# Patient Record
Sex: FEMALE | Race: ASIAN | NOT HISPANIC OR LATINO | URBAN - METROPOLITAN AREA
[De-identification: names, ages, dates, MRNs, and addresses within clinical notes are randomized per-mention and may not be internally consistent; named-entity substitution may affect disease eponyms.]

---

## 2017-07-01 ENCOUNTER — OUTPATIENT (OUTPATIENT)
Dept: OUTPATIENT SERVICES | Facility: HOSPITAL | Age: 72
LOS: 1 days | End: 2017-07-01
Payer: MEDICAID

## 2017-07-11 DIAGNOSIS — R69 ILLNESS, UNSPECIFIED: ICD-10-CM

## 2017-08-01 PROCEDURE — G9001: CPT

## 2023-08-13 ENCOUNTER — NON-APPOINTMENT (OUTPATIENT)
Age: 78
End: 2023-08-13

## 2023-08-15 PROBLEM — Z00.00 ENCOUNTER FOR PREVENTIVE HEALTH EXAMINATION: Status: ACTIVE | Noted: 2023-08-15

## 2023-09-11 ENCOUNTER — APPOINTMENT (OUTPATIENT)
Dept: OBGYN | Facility: CLINIC | Age: 78
End: 2023-09-11

## 2023-10-04 ENCOUNTER — APPOINTMENT (OUTPATIENT)
Dept: OBGYN | Facility: CLINIC | Age: 78
End: 2023-10-04
Payer: MEDICARE

## 2023-10-04 VITALS
HEART RATE: 88 BPM | TEMPERATURE: 98.2 F | DIASTOLIC BLOOD PRESSURE: 76 MMHG | OXYGEN SATURATION: 97 % | HEIGHT: 61 IN | WEIGHT: 106 LBS | RESPIRATION RATE: 15 BRPM | BODY MASS INDEX: 20.01 KG/M2 | SYSTOLIC BLOOD PRESSURE: 154 MMHG

## 2023-10-04 PROCEDURE — 99203 OFFICE O/P NEW LOW 30 MIN: CPT

## 2023-10-12 ENCOUNTER — APPOINTMENT (OUTPATIENT)
Dept: OBGYN | Facility: CLINIC | Age: 78
End: 2023-10-12
Payer: MEDICARE

## 2023-10-12 VITALS
RESPIRATION RATE: 15 BRPM | DIASTOLIC BLOOD PRESSURE: 74 MMHG | HEIGHT: 61 IN | SYSTOLIC BLOOD PRESSURE: 152 MMHG | WEIGHT: 106 LBS | TEMPERATURE: 98.2 F | HEART RATE: 81 BPM | BODY MASS INDEX: 20.01 KG/M2 | OXYGEN SATURATION: 98 %

## 2023-10-12 PROCEDURE — 99213 OFFICE O/P EST LOW 20 MIN: CPT

## 2023-10-16 ENCOUNTER — APPOINTMENT (OUTPATIENT)
Dept: OBGYN | Facility: CLINIC | Age: 78
End: 2023-10-16
Payer: MEDICARE

## 2023-10-16 VITALS
HEIGHT: 61 IN | OXYGEN SATURATION: 97 % | HEART RATE: 108 BPM | RESPIRATION RATE: 16 BRPM | TEMPERATURE: 98.7 F | DIASTOLIC BLOOD PRESSURE: 80 MMHG | SYSTOLIC BLOOD PRESSURE: 132 MMHG

## 2023-10-16 LAB — CYTOLOGY CVX/VAG DOC THIN PREP: ABNORMAL

## 2023-10-16 PROCEDURE — 99213 OFFICE O/P EST LOW 20 MIN: CPT

## 2023-10-18 ENCOUNTER — OUTPATIENT (OUTPATIENT)
Dept: OUTPATIENT SERVICES | Facility: HOSPITAL | Age: 78
LOS: 1 days | End: 2023-10-18

## 2023-10-18 VITALS
WEIGHT: 104.94 LBS | SYSTOLIC BLOOD PRESSURE: 149 MMHG | RESPIRATION RATE: 18 BRPM | TEMPERATURE: 99 F | HEIGHT: 60.5 IN | OXYGEN SATURATION: 97 % | DIASTOLIC BLOOD PRESSURE: 84 MMHG | HEART RATE: 78 BPM

## 2023-10-18 DIAGNOSIS — Z98.42 CATARACT EXTRACTION STATUS, LEFT EYE: Chronic | ICD-10-CM

## 2023-10-18 DIAGNOSIS — N95.0 POSTMENOPAUSAL BLEEDING: ICD-10-CM

## 2023-10-18 DIAGNOSIS — R03.0 ELEVATED BLOOD-PRESSURE READING, WITHOUT DIAGNOSIS OF HYPERTENSION: ICD-10-CM

## 2023-10-18 DIAGNOSIS — Z98.890 OTHER SPECIFIED POSTPROCEDURAL STATES: Chronic | ICD-10-CM

## 2023-10-18 LAB
A1C WITH ESTIMATED AVERAGE GLUCOSE RESULT: 6.3 % — HIGH (ref 4–5.6)
A1C WITH ESTIMATED AVERAGE GLUCOSE RESULT: 6.3 % — HIGH (ref 4–5.6)
ANION GAP SERPL CALC-SCNC: 10 MMOL/L — SIGNIFICANT CHANGE UP (ref 7–14)
ANION GAP SERPL CALC-SCNC: 10 MMOL/L — SIGNIFICANT CHANGE UP (ref 7–14)
BUN SERPL-MCNC: 12 MG/DL — SIGNIFICANT CHANGE UP (ref 7–23)
BUN SERPL-MCNC: 12 MG/DL — SIGNIFICANT CHANGE UP (ref 7–23)
CALCIUM SERPL-MCNC: 9.6 MG/DL — SIGNIFICANT CHANGE UP (ref 8.4–10.5)
CALCIUM SERPL-MCNC: 9.6 MG/DL — SIGNIFICANT CHANGE UP (ref 8.4–10.5)
CHLORIDE SERPL-SCNC: 103 MMOL/L — SIGNIFICANT CHANGE UP (ref 98–107)
CHLORIDE SERPL-SCNC: 103 MMOL/L — SIGNIFICANT CHANGE UP (ref 98–107)
CO2 SERPL-SCNC: 28 MMOL/L — SIGNIFICANT CHANGE UP (ref 22–31)
CO2 SERPL-SCNC: 28 MMOL/L — SIGNIFICANT CHANGE UP (ref 22–31)
CREAT SERPL-MCNC: 0.73 MG/DL — SIGNIFICANT CHANGE UP (ref 0.5–1.3)
CREAT SERPL-MCNC: 0.73 MG/DL — SIGNIFICANT CHANGE UP (ref 0.5–1.3)
EGFR: 84 ML/MIN/1.73M2 — SIGNIFICANT CHANGE UP
EGFR: 84 ML/MIN/1.73M2 — SIGNIFICANT CHANGE UP
ESTIMATED AVERAGE GLUCOSE: 134 — SIGNIFICANT CHANGE UP
ESTIMATED AVERAGE GLUCOSE: 134 — SIGNIFICANT CHANGE UP
GLUCOSE SERPL-MCNC: 130 MG/DL — HIGH (ref 70–99)
GLUCOSE SERPL-MCNC: 130 MG/DL — HIGH (ref 70–99)
HCT VFR BLD CALC: 37.9 % — SIGNIFICANT CHANGE UP (ref 34.5–45)
HCT VFR BLD CALC: 37.9 % — SIGNIFICANT CHANGE UP (ref 34.5–45)
HGB BLD-MCNC: 13 G/DL — SIGNIFICANT CHANGE UP (ref 11.5–15.5)
HGB BLD-MCNC: 13 G/DL — SIGNIFICANT CHANGE UP (ref 11.5–15.5)
MCHC RBC-ENTMCNC: 30.1 PG — SIGNIFICANT CHANGE UP (ref 27–34)
MCHC RBC-ENTMCNC: 30.1 PG — SIGNIFICANT CHANGE UP (ref 27–34)
MCHC RBC-ENTMCNC: 34.3 GM/DL — SIGNIFICANT CHANGE UP (ref 32–36)
MCHC RBC-ENTMCNC: 34.3 GM/DL — SIGNIFICANT CHANGE UP (ref 32–36)
MCV RBC AUTO: 87.7 FL — SIGNIFICANT CHANGE UP (ref 80–100)
MCV RBC AUTO: 87.7 FL — SIGNIFICANT CHANGE UP (ref 80–100)
NRBC # BLD: 0 /100 WBCS — SIGNIFICANT CHANGE UP (ref 0–0)
NRBC # BLD: 0 /100 WBCS — SIGNIFICANT CHANGE UP (ref 0–0)
NRBC # FLD: 0 K/UL — SIGNIFICANT CHANGE UP (ref 0–0)
NRBC # FLD: 0 K/UL — SIGNIFICANT CHANGE UP (ref 0–0)
PLATELET # BLD AUTO: 238 K/UL — SIGNIFICANT CHANGE UP (ref 150–400)
PLATELET # BLD AUTO: 238 K/UL — SIGNIFICANT CHANGE UP (ref 150–400)
POTASSIUM SERPL-MCNC: 4.4 MMOL/L — SIGNIFICANT CHANGE UP (ref 3.5–5.3)
POTASSIUM SERPL-MCNC: 4.4 MMOL/L — SIGNIFICANT CHANGE UP (ref 3.5–5.3)
POTASSIUM SERPL-SCNC: 4.4 MMOL/L — SIGNIFICANT CHANGE UP (ref 3.5–5.3)
POTASSIUM SERPL-SCNC: 4.4 MMOL/L — SIGNIFICANT CHANGE UP (ref 3.5–5.3)
RBC # BLD: 4.32 M/UL — SIGNIFICANT CHANGE UP (ref 3.8–5.2)
RBC # BLD: 4.32 M/UL — SIGNIFICANT CHANGE UP (ref 3.8–5.2)
RBC # FLD: 11.8 % — SIGNIFICANT CHANGE UP (ref 10.3–14.5)
RBC # FLD: 11.8 % — SIGNIFICANT CHANGE UP (ref 10.3–14.5)
SODIUM SERPL-SCNC: 141 MMOL/L — SIGNIFICANT CHANGE UP (ref 135–145)
SODIUM SERPL-SCNC: 141 MMOL/L — SIGNIFICANT CHANGE UP (ref 135–145)
WBC # BLD: 6.9 K/UL — SIGNIFICANT CHANGE UP (ref 3.8–10.5)
WBC # BLD: 6.9 K/UL — SIGNIFICANT CHANGE UP (ref 3.8–10.5)
WBC # FLD AUTO: 6.9 K/UL — SIGNIFICANT CHANGE UP (ref 3.8–10.5)
WBC # FLD AUTO: 6.9 K/UL — SIGNIFICANT CHANGE UP (ref 3.8–10.5)

## 2023-10-18 RX ORDER — MISOPROSTOL 200 UG/1
200 TABLET ORAL
Qty: 2 | Refills: 0 | Status: ACTIVE | COMMUNITY
Start: 2023-10-18 | End: 1900-01-01

## 2023-10-18 RX ORDER — SODIUM CHLORIDE 9 MG/ML
1000 INJECTION, SOLUTION INTRAVENOUS
Refills: 0 | Status: DISCONTINUED | OUTPATIENT
Start: 2023-10-24 | End: 2023-11-07

## 2023-10-18 NOTE — H&P PST ADULT - NSICDXFAMILYHX_GEN_ALL_CORE_FT
FAMILY HISTORY:  Mother  Still living? No  Family history of cancer of hard palate, Age at diagnosis: Age Unknown    Sibling  Still living? Unknown  FH: pneumonia, Age at diagnosis: Age Unknown

## 2023-10-18 NOTE — H&P PST ADULT - NSICDXPASTMEDICALHX_GEN_ALL_CORE_FT
PAST MEDICAL HISTORY:  Glaucoma     Hyperlipidemia     PMB (postmenopausal bleeding)     Right cataract     Type 2 diabetes, diet controlled

## 2023-10-18 NOTE — H&P PST ADULT - ENMT COMMENTS
has full upper and lower dentures Mercedes Flap Text: The defect edges were debeveled with a #15 scalpel blade.  Given the location of the defect, shape of the defect and the proximity to free margins a Mercedes flap was deemed most appropriate.  Using a sterile surgical marker, an appropriate advancement flap was drawn incorporating the defect and placing the expected incisions within the relaxed skin tension lines where possible. The area thus outlined was incised deep to adipose tissue with a #15 scalpel blade.  The skin margins were undermined to an appropriate distance in all directions utilizing iris scissors. Mallampati III on phonation

## 2023-10-18 NOTE — H&P PST ADULT - PROBLEM SELECTOR PLAN 2
Pt. states "it's only high when I have go to the Doctor's office."  Pt. states "when I take it at home it's always normal so my Doctor is ok with that."

## 2023-10-18 NOTE — H&P PST ADULT - PROBLEM SELECTOR PLAN 1
Pt. is scheduled for a D&C hysteroscopy 10/24/23.  Pt. verbalized understanding of instructions.  Pt. had medical clearance as requested by the Surgeon.  The clearance is in the chart.

## 2023-10-23 ENCOUNTER — APPOINTMENT (OUTPATIENT)
Dept: OBGYN | Facility: CLINIC | Age: 78
End: 2023-10-23
Payer: MEDICARE

## 2023-10-23 ENCOUNTER — TRANSCRIPTION ENCOUNTER (OUTPATIENT)
Age: 78
End: 2023-10-23

## 2023-10-23 VITALS
OXYGEN SATURATION: 98 % | HEART RATE: 84 BPM | DIASTOLIC BLOOD PRESSURE: 74 MMHG | WEIGHT: 106 LBS | RESPIRATION RATE: 16 BRPM | HEIGHT: 61 IN | SYSTOLIC BLOOD PRESSURE: 160 MMHG | TEMPERATURE: 98.3 F | BODY MASS INDEX: 20.01 KG/M2

## 2023-10-23 PROCEDURE — 99213 OFFICE O/P EST LOW 20 MIN: CPT

## 2023-10-24 ENCOUNTER — TRANSCRIPTION ENCOUNTER (OUTPATIENT)
Age: 78
End: 2023-10-24

## 2023-10-24 ENCOUNTER — OUTPATIENT (OUTPATIENT)
Dept: OUTPATIENT SERVICES | Facility: HOSPITAL | Age: 78
LOS: 1 days | Discharge: HOME | End: 2023-10-24
Payer: MEDICARE

## 2023-10-24 VITALS
SYSTOLIC BLOOD PRESSURE: 173 MMHG | TEMPERATURE: 98 F | OXYGEN SATURATION: 99 % | HEART RATE: 76 BPM | DIASTOLIC BLOOD PRESSURE: 67 MMHG | HEIGHT: 60.5 IN | RESPIRATION RATE: 15 BRPM | WEIGHT: 104.94 LBS

## 2023-10-24 VITALS
SYSTOLIC BLOOD PRESSURE: 112 MMHG | DIASTOLIC BLOOD PRESSURE: 50 MMHG | OXYGEN SATURATION: 100 % | HEART RATE: 67 BPM | RESPIRATION RATE: 19 BRPM

## 2023-10-24 DIAGNOSIS — Z98.890 OTHER SPECIFIED POSTPROCEDURAL STATES: Chronic | ICD-10-CM

## 2023-10-24 DIAGNOSIS — N95.0 POSTMENOPAUSAL BLEEDING: ICD-10-CM

## 2023-10-24 DIAGNOSIS — Z98.42 CATARACT EXTRACTION STATUS, LEFT EYE: Chronic | ICD-10-CM

## 2023-10-24 LAB
GLUCOSE BLDC GLUCOMTR-MCNC: 112 MG/DL — HIGH (ref 70–99)
GLUCOSE BLDC GLUCOMTR-MCNC: 112 MG/DL — HIGH (ref 70–99)

## 2023-10-24 PROCEDURE — 58120 DILATION AND CURETTAGE: CPT

## 2023-10-24 PROCEDURE — 58555 HYSTEROSCOPY DX SEP PROC: CPT

## 2023-10-24 NOTE — ASU DISCHARGE PLAN (ADULT/PEDIATRIC) - CARE PROVIDER_API CALL
Alexa Headley  Obstetrics and Gynecology  [address], [address]  [place] [address]  Phone: [phone]  Fax: [phone]  Follow Up Time: 2 weeks

## 2023-10-24 NOTE — DISCHARGE NOTE NURSING/CASE MANAGEMENT/SOCIAL WORK - NSDCPEFALRISK_GEN_ALL_CORE
For information on Fall & Injury Prevention, visit: [email or web address] OR  [email or web address]/[email or web address]-injury OR  https://www.cdc.gov/steadi/patient.html

## 2023-10-24 NOTE — DISCHARGE NOTE NURSING/CASE MANAGEMENT/SOCIAL WORK - PATIENT PORTAL LINK FT
You can access the FollowMyHealth Patient Portal offered by [facility] by registering at the following website: [email or web address]. By joining [employer]’s FollowMyHealth portal, you will also be able to view your health information using other applications (apps) compatible with our system.

## 2023-10-25 ENCOUNTER — RESULT REVIEW (OUTPATIENT)
Age: 78
End: 2023-10-25

## 2023-10-25 PROCEDURE — 88305 TISSUE EXAM BY PATHOLOGIST: CPT | Mod: 26

## 2023-10-25 PROCEDURE — 88341 IMHCHEM/IMCYTCHM EA ADD ANTB: CPT | Mod: 26

## 2023-10-25 PROCEDURE — 88342 IMHCHEM/IMCYTCHM 1ST ANTB: CPT | Mod: 26

## 2023-11-06 LAB
SURGICAL PATHOLOGY STUDY: SIGNIFICANT CHANGE UP
SURGICAL PATHOLOGY STUDY: SIGNIFICANT CHANGE UP

## 2023-11-09 ENCOUNTER — APPOINTMENT (OUTPATIENT)
Dept: OBGYN | Facility: CLINIC | Age: 78
End: 2023-11-09
Payer: MEDICARE

## 2023-11-09 ENCOUNTER — NON-APPOINTMENT (OUTPATIENT)
Age: 78
End: 2023-11-09

## 2023-11-09 VITALS
DIASTOLIC BLOOD PRESSURE: 84 MMHG | BODY MASS INDEX: 20.01 KG/M2 | HEIGHT: 61 IN | HEART RATE: 76 BPM | WEIGHT: 106 LBS | SYSTOLIC BLOOD PRESSURE: 154 MMHG | TEMPERATURE: 98.3 F | RESPIRATION RATE: 16 BRPM | OXYGEN SATURATION: 99 %

## 2023-11-09 PROCEDURE — 99213 OFFICE O/P EST LOW 20 MIN: CPT

## 2023-11-10 PROBLEM — H26.9 UNSPECIFIED CATARACT: Chronic | Status: ACTIVE | Noted: 2023-10-18

## 2023-11-10 PROBLEM — E78.5 HYPERLIPIDEMIA, UNSPECIFIED: Chronic | Status: ACTIVE | Noted: 2023-10-18

## 2023-11-10 PROBLEM — N95.0 POSTMENOPAUSAL BLEEDING: Chronic | Status: ACTIVE | Noted: 2023-10-18

## 2023-11-10 PROBLEM — E11.9 TYPE 2 DIABETES MELLITUS WITHOUT COMPLICATIONS: Chronic | Status: ACTIVE | Noted: 2023-10-18

## 2023-11-10 PROBLEM — H40.9 UNSPECIFIED GLAUCOMA: Chronic | Status: ACTIVE | Noted: 2023-10-18

## 2023-11-16 PROBLEM — N95.0 PMB (POSTMENOPAUSAL BLEEDING): Status: ACTIVE | Noted: 2023-10-04

## 2023-11-17 ENCOUNTER — APPOINTMENT (OUTPATIENT)
Dept: GYNECOLOGIC ONCOLOGY | Facility: CLINIC | Age: 78
End: 2023-11-17
Payer: MEDICARE

## 2023-11-17 ENCOUNTER — RESULT REVIEW (OUTPATIENT)
Age: 78
End: 2023-11-17

## 2023-11-17 VITALS
HEIGHT: 61 IN | BODY MASS INDEX: 20.2 KG/M2 | WEIGHT: 107 LBS | DIASTOLIC BLOOD PRESSURE: 81 MMHG | SYSTOLIC BLOOD PRESSURE: 148 MMHG | HEART RATE: 87 BPM

## 2023-11-17 DIAGNOSIS — Z82.49 FAMILY HISTORY OF ISCHEMIC HEART DISEASE AND OTHER DISEASES OF THE CIRCULATORY SYSTEM: ICD-10-CM

## 2023-11-17 DIAGNOSIS — Z86.39 PERSONAL HISTORY OF OTHER ENDOCRINE, NUTRITIONAL AND METABOLIC DISEASE: ICD-10-CM

## 2023-11-17 DIAGNOSIS — Z80.9 FAMILY HISTORY OF MALIGNANT NEOPLASM, UNSPECIFIED: ICD-10-CM

## 2023-11-17 DIAGNOSIS — N95.0 POSTMENOPAUSAL BLEEDING: ICD-10-CM

## 2023-11-17 DIAGNOSIS — Z86.69 PERSONAL HISTORY OF OTHER DISEASES OF THE NERVOUS SYSTEM AND SENSE ORGANS: ICD-10-CM

## 2023-11-17 DIAGNOSIS — Z78.9 OTHER SPECIFIED HEALTH STATUS: ICD-10-CM

## 2023-11-17 DIAGNOSIS — Z87.19 PERSONAL HISTORY OF OTHER DISEASES OF THE DIGESTIVE SYSTEM: ICD-10-CM

## 2023-11-17 LAB
APTT BLD: 30.8 SEC
INR PPP: 1.01 RATIO
PT BLD: 11.4 SEC

## 2023-11-17 PROCEDURE — 99205 OFFICE O/P NEW HI 60 MIN: CPT

## 2023-11-17 RX ORDER — BRIMONIDINE/DORZOLAMIDE/PF 0.15 %-2 %
0.15-2 DROPS OPHTHALMIC (EYE)
Refills: 0 | Status: ACTIVE | COMMUNITY

## 2023-11-17 RX ORDER — LATANOPROST/PF 0.005 %
0.01 DROPS OPHTHALMIC (EYE)
Refills: 0 | Status: ACTIVE | COMMUNITY

## 2023-11-17 RX ORDER — FEXOFENADINE HCL 60 MG
TABLET ORAL
Refills: 0 | Status: ACTIVE | COMMUNITY

## 2023-11-17 RX ORDER — ATORVASTATIN CALCIUM 20 MG/1
20 TABLET, FILM COATED ORAL
Refills: 0 | Status: ACTIVE | COMMUNITY

## 2023-11-17 RX ORDER — ASCORBIC ACID 500 MG
TABLET ORAL
Refills: 0 | Status: ACTIVE | COMMUNITY

## 2023-11-17 RX ORDER — FAMOTIDINE 20 MG/1
20 TABLET, FILM COATED ORAL
Refills: 0 | Status: ACTIVE | COMMUNITY

## 2023-11-20 ENCOUNTER — APPOINTMENT (OUTPATIENT)
Dept: CT IMAGING | Facility: CLINIC | Age: 78
End: 2023-11-20
Payer: MEDICARE

## 2023-11-20 LAB
ALBUMIN SERPL ELPH-MCNC: 4.4 G/DL
ALP BLD-CCNC: 67 U/L
ALT SERPL-CCNC: 12 U/L
ANION GAP SERPL CALC-SCNC: 12 MMOL/L
AST SERPL-CCNC: 20 U/L
BILIRUB SERPL-MCNC: 0.3 MG/DL
BUN SERPL-MCNC: 13 MG/DL
CALCIUM SERPL-MCNC: 9.5 MG/DL
CANCER AG125 SERPL-ACNC: 36 U/ML
CANCER AG19-9 SERPL-ACNC: 12 U/ML
CEA SERPL-MCNC: 3.3 NG/ML
CHLORIDE SERPL-SCNC: 102 MMOL/L
CO2 SERPL-SCNC: 27 MMOL/L
CREAT SERPL-MCNC: 0.77 MG/DL
EGFR: 79 ML/MIN/1.73M2
ESTIMATED AVERAGE GLUCOSE: 148 MG/DL
GLUCOSE SERPL-MCNC: 113 MG/DL
HBA1C MFR BLD HPLC: 6.8 %
POTASSIUM SERPL-SCNC: 4.2 MMOL/L
PROT SERPL-MCNC: 7.3 G/DL
SODIUM SERPL-SCNC: 141 MMOL/L

## 2023-11-20 PROCEDURE — 71260 CT THORAX DX C+: CPT

## 2023-11-20 PROCEDURE — 74177 CT ABD & PELVIS W/CONTRAST: CPT

## 2023-11-21 ENCOUNTER — APPOINTMENT (OUTPATIENT)
Dept: GYNECOLOGIC ONCOLOGY | Facility: CLINIC | Age: 78
End: 2023-11-21
Payer: MEDICARE

## 2023-11-21 PROCEDURE — 99212 OFFICE O/P EST SF 10 MIN: CPT | Mod: 95

## 2023-12-05 ENCOUNTER — OUTPATIENT (OUTPATIENT)
Dept: OUTPATIENT SERVICES | Facility: HOSPITAL | Age: 78
LOS: 1 days | End: 2023-12-05
Payer: COMMERCIAL

## 2023-12-05 VITALS
TEMPERATURE: 97 F | WEIGHT: 106.04 LBS | HEIGHT: 61 IN | SYSTOLIC BLOOD PRESSURE: 137 MMHG | DIASTOLIC BLOOD PRESSURE: 71 MMHG | RESPIRATION RATE: 16 BRPM | HEART RATE: 89 BPM | OXYGEN SATURATION: 98 %

## 2023-12-05 DIAGNOSIS — Z98.890 OTHER SPECIFIED POSTPROCEDURAL STATES: Chronic | ICD-10-CM

## 2023-12-05 DIAGNOSIS — C54.1 MALIGNANT NEOPLASM OF ENDOMETRIUM: ICD-10-CM

## 2023-12-05 DIAGNOSIS — Z98.42 CATARACT EXTRACTION STATUS, LEFT EYE: Chronic | ICD-10-CM

## 2023-12-05 DIAGNOSIS — E78.5 HYPERLIPIDEMIA, UNSPECIFIED: ICD-10-CM

## 2023-12-05 DIAGNOSIS — N95.0 POSTMENOPAUSAL BLEEDING: ICD-10-CM

## 2023-12-05 DIAGNOSIS — Z01.818 ENCOUNTER FOR OTHER PREPROCEDURAL EXAMINATION: ICD-10-CM

## 2023-12-05 DIAGNOSIS — K21.9 GASTRO-ESOPHAGEAL REFLUX DISEASE WITHOUT ESOPHAGITIS: ICD-10-CM

## 2023-12-05 LAB
ALBUMIN SERPL ELPH-MCNC: 3.2 G/DL — LOW (ref 3.5–5)
ALBUMIN SERPL ELPH-MCNC: 3.2 G/DL — LOW (ref 3.5–5)
ALP SERPL-CCNC: 58 U/L — SIGNIFICANT CHANGE UP (ref 40–120)
ALP SERPL-CCNC: 58 U/L — SIGNIFICANT CHANGE UP (ref 40–120)
ALT FLD-CCNC: 20 U/L DA — SIGNIFICANT CHANGE UP (ref 10–60)
ALT FLD-CCNC: 20 U/L DA — SIGNIFICANT CHANGE UP (ref 10–60)
ANION GAP SERPL CALC-SCNC: 3 MMOL/L — LOW (ref 5–17)
ANION GAP SERPL CALC-SCNC: 3 MMOL/L — LOW (ref 5–17)
APTT BLD: 30.1 SEC — SIGNIFICANT CHANGE UP (ref 24.5–35.6)
APTT BLD: 30.1 SEC — SIGNIFICANT CHANGE UP (ref 24.5–35.6)
AST SERPL-CCNC: 19 U/L — SIGNIFICANT CHANGE UP (ref 10–40)
AST SERPL-CCNC: 19 U/L — SIGNIFICANT CHANGE UP (ref 10–40)
BILIRUB SERPL-MCNC: 0.3 MG/DL — SIGNIFICANT CHANGE UP (ref 0.2–1.2)
BILIRUB SERPL-MCNC: 0.3 MG/DL — SIGNIFICANT CHANGE UP (ref 0.2–1.2)
BLD GP AB SCN SERPL QL: SIGNIFICANT CHANGE UP
BLD GP AB SCN SERPL QL: SIGNIFICANT CHANGE UP
BUN SERPL-MCNC: 7 MG/DL — SIGNIFICANT CHANGE UP (ref 7–18)
BUN SERPL-MCNC: 7 MG/DL — SIGNIFICANT CHANGE UP (ref 7–18)
CALCIUM SERPL-MCNC: 9.1 MG/DL — SIGNIFICANT CHANGE UP (ref 8.4–10.5)
CALCIUM SERPL-MCNC: 9.1 MG/DL — SIGNIFICANT CHANGE UP (ref 8.4–10.5)
CHLORIDE SERPL-SCNC: 105 MMOL/L — SIGNIFICANT CHANGE UP (ref 96–108)
CHLORIDE SERPL-SCNC: 105 MMOL/L — SIGNIFICANT CHANGE UP (ref 96–108)
CO2 SERPL-SCNC: 30 MMOL/L — SIGNIFICANT CHANGE UP (ref 22–31)
CO2 SERPL-SCNC: 30 MMOL/L — SIGNIFICANT CHANGE UP (ref 22–31)
CREAT SERPL-MCNC: 0.67 MG/DL — SIGNIFICANT CHANGE UP (ref 0.5–1.3)
CREAT SERPL-MCNC: 0.67 MG/DL — SIGNIFICANT CHANGE UP (ref 0.5–1.3)
EGFR: 89 ML/MIN/1.73M2 — SIGNIFICANT CHANGE UP
EGFR: 89 ML/MIN/1.73M2 — SIGNIFICANT CHANGE UP
GLUCOSE SERPL-MCNC: 129 MG/DL — HIGH (ref 70–99)
GLUCOSE SERPL-MCNC: 129 MG/DL — HIGH (ref 70–99)
HCT VFR BLD CALC: 36.7 % — SIGNIFICANT CHANGE UP (ref 34.5–45)
HCT VFR BLD CALC: 36.7 % — SIGNIFICANT CHANGE UP (ref 34.5–45)
HGB BLD-MCNC: 11.9 G/DL — SIGNIFICANT CHANGE UP (ref 11.5–15.5)
HGB BLD-MCNC: 11.9 G/DL — SIGNIFICANT CHANGE UP (ref 11.5–15.5)
INR BLD: 1.04 RATIO — SIGNIFICANT CHANGE UP (ref 0.85–1.18)
INR BLD: 1.04 RATIO — SIGNIFICANT CHANGE UP (ref 0.85–1.18)
MCHC RBC-ENTMCNC: 29 PG — SIGNIFICANT CHANGE UP (ref 27–34)
MCHC RBC-ENTMCNC: 29 PG — SIGNIFICANT CHANGE UP (ref 27–34)
MCHC RBC-ENTMCNC: 32.4 GM/DL — SIGNIFICANT CHANGE UP (ref 32–36)
MCHC RBC-ENTMCNC: 32.4 GM/DL — SIGNIFICANT CHANGE UP (ref 32–36)
MCV RBC AUTO: 89.5 FL — SIGNIFICANT CHANGE UP (ref 80–100)
MCV RBC AUTO: 89.5 FL — SIGNIFICANT CHANGE UP (ref 80–100)
NRBC # BLD: 0 /100 WBCS — SIGNIFICANT CHANGE UP (ref 0–0)
NRBC # BLD: 0 /100 WBCS — SIGNIFICANT CHANGE UP (ref 0–0)
PLATELET # BLD AUTO: 258 K/UL — SIGNIFICANT CHANGE UP (ref 150–400)
PLATELET # BLD AUTO: 258 K/UL — SIGNIFICANT CHANGE UP (ref 150–400)
POTASSIUM SERPL-MCNC: 3.5 MMOL/L — SIGNIFICANT CHANGE UP (ref 3.5–5.3)
POTASSIUM SERPL-MCNC: 3.5 MMOL/L — SIGNIFICANT CHANGE UP (ref 3.5–5.3)
POTASSIUM SERPL-SCNC: 3.5 MMOL/L — SIGNIFICANT CHANGE UP (ref 3.5–5.3)
POTASSIUM SERPL-SCNC: 3.5 MMOL/L — SIGNIFICANT CHANGE UP (ref 3.5–5.3)
PROT SERPL-MCNC: 7.4 G/DL — SIGNIFICANT CHANGE UP (ref 6–8.3)
PROT SERPL-MCNC: 7.4 G/DL — SIGNIFICANT CHANGE UP (ref 6–8.3)
PROTHROM AB SERPL-ACNC: 11.8 SEC — SIGNIFICANT CHANGE UP (ref 9.5–13)
PROTHROM AB SERPL-ACNC: 11.8 SEC — SIGNIFICANT CHANGE UP (ref 9.5–13)
RBC # BLD: 4.1 M/UL — SIGNIFICANT CHANGE UP (ref 3.8–5.2)
RBC # BLD: 4.1 M/UL — SIGNIFICANT CHANGE UP (ref 3.8–5.2)
RBC # FLD: 12.4 % — SIGNIFICANT CHANGE UP (ref 10.3–14.5)
RBC # FLD: 12.4 % — SIGNIFICANT CHANGE UP (ref 10.3–14.5)
SODIUM SERPL-SCNC: 138 MMOL/L — SIGNIFICANT CHANGE UP (ref 135–145)
SODIUM SERPL-SCNC: 138 MMOL/L — SIGNIFICANT CHANGE UP (ref 135–145)
WBC # BLD: 8.83 K/UL — SIGNIFICANT CHANGE UP (ref 3.8–10.5)
WBC # BLD: 8.83 K/UL — SIGNIFICANT CHANGE UP (ref 3.8–10.5)
WBC # FLD AUTO: 8.83 K/UL — SIGNIFICANT CHANGE UP (ref 3.8–10.5)
WBC # FLD AUTO: 8.83 K/UL — SIGNIFICANT CHANGE UP (ref 3.8–10.5)

## 2023-12-05 RX ORDER — IBUPROFEN 200 MG
1 TABLET ORAL
Qty: 0 | Refills: 0 | DISCHARGE

## 2023-12-05 RX ORDER — ACETAMINOPHEN 500 MG
2 TABLET ORAL
Qty: 0 | Refills: 0 | DISCHARGE

## 2023-12-05 NOTE — H&P PST ADULT - PROBLEM SELECTOR PLAN 1
Pt schedule for Examination Under Anesthesia Robotic Assisted Total Laparoscopic Hysterectomy with Bilateral Salpingo-oophorectomy and [place] Lymph Node Mapping with Staging Possible Open on 12/14/23 with Dr Quintero drawn by PST - will f/u result  Pt was seen by PCP for Medical clearance - will f/u report    Pt was  instructed to stop aspirin/ecotrin and all over the counter medication including vitamins and herbal supplements one week prior to surgery   Instructions given on the use of 4% chlorhexidine wash and Pt verbalized understanding of same   Pt Instructed to have nothing by mouth starting midnight day before surgery  Patient is to expect a phone call day before surgery between the hours of 430- 630pm giving arrival time for surgery   Written and verbal preoperative instructions given to patient with understanding verbalized.     Patient today with STOP bang score 1  Low  risk for MISTY Pt schedule for Examination Under Anesthesia Robotic Assisted Total Laparoscopic Hysterectomy with Bilateral Salpingo-oophorectomy and [place] Lymph Node Mapping with Staging Possible Open on 12/14/23 with Dr Quintero drawn by PST - will f/u result  Pt was seen by PCP for Medical clearance - will f/u report    Pt was  instructed to stop aspirin/ecotrin and all over the counter medication including vitamins and herbal supplements one week prior to surgery   Instructions given on the use of 4% chlorhexidine wash and Pt verbalized understanding of same   Pt Instructed to have nothing by mouth starting midnight day before surgery  Patient is to expect a phone call day before surgery between the hours of 430- 630pm giving arrival time for surgery   Written and verbal preoperative instructions given to patient with understanding verbalized.     Patient today with STOP bang score 1  Low  risk for MISTY

## 2023-12-05 NOTE — H&P PST ADULT - NSICDXPROCEDURE_GEN_ALL_CORE_FT
PROCEDURES:  Robot-assisted laparoscopic total hysterectomy with salpingo-oophorectomy for uterus 250 grams or less using da Robert Xi 05-Dec-2023 11:57:30  Shanita Antoine

## 2023-12-05 NOTE — H&P PST ADULT - REASON FOR ADMISSION
Examination Under Anesthesia Robotic Assisted Total Laparoscopic Hysterectomy with Bilateral Salpingo-OOphorectomy and [place] Lymph Node Mapping with Staging Possible Open  on 12/14/23 with Dr Alonso Examination Under Anesthesia Robotic Assisted Total Laparoscopic Hysterectomy with Bilateral Salpingo-OOphorectomy and [place] Lymph Node Mapping with Staging Possible Open  on 12/14/23 with Dr Alonso

## 2023-12-05 NOTE — H&P PST ADULT - HISTORY OF PRESENT ILLNESS
78 y.o female with PMHx for GERD, HLD. c/o abnormal vaginal bleeding, s/p D&C 10/2023, pathology was consistent with Malignant Neoplasm of Endometrium  now presents for presurgical evaluation for schedule Examination Under Anesthesia Robotic Assisted Total Laparoscopic Hysterectomy with Bilateral Salpingo-oophorectomy  and [place] Lymph Node Mapping with Staging Possible Open on 12/14/23 with Dr Alonso 78 y.o female with PMHx for GERD, HLD. c/o abnormal vaginal bleeding, s/p D&C 10/2023, pathology was consistent with Malignant Neoplasm of Endometrium  now presents for presurgical evaluation for schedule Examination Under Anesthesia Robotic Assisted Total Laparoscopic Hysterectomy with Bilateral Salpingo-oophorectomy  and [place] Lymph Node Mapping with Staging Possible Open on 12/14/23 with Dr Alonso

## 2023-12-05 NOTE — H&P PST ADULT - ASSESSMENT
78 y.o female with with Malignant Neoplasm of Endometrium now presents for schedule Examination Under Anesthesia Robotic Assisted Total Laparoscopic Hysterectomy with Bilateral Salpingo-oophorectomy and [place] Lymph Node Mapping with Staging Possible Open on 12/14/23 with Dr Celeste GIL 1     78 y.o female with with Malignant Neoplasm of Endometrium now presents for schedule Examination Under Anesthesia Robotic Assisted Total Laparoscopic Hysterectomy with Bilateral Salpingo-oophorectomy and [place] Lymph Node Mapping with Staging Possible Open on 12/14/23 with Dr Celeste GIL 1     78 y.o female with Malignant Neoplasm of Endometrium now for schedule Examination Under Anesthesia Robotic Assisted Total Laparoscopic Hysterectomy with Bilateral Salpingo-oophorectomy and [place] Lymph Node Mapping with Staging Possible Open on 12/14/23 with Dr Celeste GIL 1     78 y.o female with Malignant Neoplasm of Endometrium now for schedule Examination Under Anesthesia Robotic Assisted Total Laparoscopic Hysterectomy with Bilateral Salpingo-oophorectomy and [place] Lymph Node Mapping with Staging Possible Open on 12/14/23 with Dr Celeste GIL 1

## 2023-12-05 NOTE — H&P PST ADULT - ATTENDING COMMENTS
Patient seen in ASU with her family friend Catia, no changes reported. Consent form reviewed, including examination by learner and possible conversion to open procedure, debulking. All questions answered.    Deja Aguila MD

## 2023-12-05 NOTE — H&P PST ADULT - PROBLEM SELECTOR PROBLEM 3
GERD (gastroesophageal reflux disease)
Pt will achieve gradual 1-2lbs/week weight gain to improve nutrition status

## 2023-12-05 NOTE — H&P PST ADULT - NSICDXPASTMEDICALHX_GEN_ALL_CORE_FT
PAST MEDICAL HISTORY:  Glaucoma     Hyperlipidemia     PMB (postmenopausal bleeding)     Right cataract     Type 2 diabetes, diet controlled      PAST MEDICAL HISTORY:  Glaucoma     Hyperlipidemia     Malignant neoplasm of endometrium     PMB (postmenopausal bleeding)     Right cataract     Type 2 diabetes, diet controlled

## 2023-12-06 LAB
A1C WITH ESTIMATED AVERAGE GLUCOSE RESULT: 6.5 % — HIGH (ref 4–5.6)
A1C WITH ESTIMATED AVERAGE GLUCOSE RESULT: 6.5 % — HIGH (ref 4–5.6)
ESTIMATED AVERAGE GLUCOSE: 140 MG/DL — HIGH (ref 68–114)
ESTIMATED AVERAGE GLUCOSE: 140 MG/DL — HIGH (ref 68–114)

## 2023-12-06 PROCEDURE — G0463: CPT

## 2023-12-12 RX ORDER — CIPROFLOXACIN HYDROCHLORIDE 500 MG/1
500 TABLET, FILM COATED ORAL
Qty: 2 | Refills: 0 | Status: ACTIVE | COMMUNITY
Start: 1900-01-01 | End: 1900-01-01

## 2023-12-12 RX ORDER — METRONIDAZOLE 500 MG/1
500 TABLET ORAL
Qty: 2 | Refills: 0 | Status: ACTIVE | COMMUNITY
Start: 1900-01-01 | End: 1900-01-01

## 2023-12-13 ENCOUNTER — TRANSCRIPTION ENCOUNTER (OUTPATIENT)
Age: 78
End: 2023-12-13

## 2023-12-14 ENCOUNTER — RESULT REVIEW (OUTPATIENT)
Age: 78
End: 2023-12-14

## 2023-12-14 ENCOUNTER — APPOINTMENT (OUTPATIENT)
Dept: GYNECOLOGIC ONCOLOGY | Facility: HOSPITAL | Age: 78
End: 2023-12-14

## 2023-12-14 ENCOUNTER — TRANSCRIPTION ENCOUNTER (OUTPATIENT)
Age: 78
End: 2023-12-14

## 2023-12-14 ENCOUNTER — INPATIENT (INPATIENT)
Facility: HOSPITAL | Age: 78
LOS: 0 days | Discharge: HOME | DRG: 734 | End: 2023-12-15
Attending: OBSTETRICS & GYNECOLOGY | Admitting: OBSTETRICS & GYNECOLOGY
Payer: COMMERCIAL

## 2023-12-14 VITALS
WEIGHT: 106.04 LBS | OXYGEN SATURATION: 97 % | HEART RATE: 108 BPM | RESPIRATION RATE: 16 BRPM | SYSTOLIC BLOOD PRESSURE: 128 MMHG | DIASTOLIC BLOOD PRESSURE: 71 MMHG | HEIGHT: 61 IN | TEMPERATURE: 99 F

## 2023-12-14 DIAGNOSIS — Z98.890 OTHER SPECIFIED POSTPROCEDURAL STATES: Chronic | ICD-10-CM

## 2023-12-14 DIAGNOSIS — C54.1 MALIGNANT NEOPLASM OF ENDOMETRIUM: ICD-10-CM

## 2023-12-14 DIAGNOSIS — Z98.42 CATARACT EXTRACTION STATUS, LEFT EYE: Chronic | ICD-10-CM

## 2023-12-14 DIAGNOSIS — N95.0 POSTMENOPAUSAL BLEEDING: ICD-10-CM

## 2023-12-14 LAB
BLD GP AB SCN SERPL QL: SIGNIFICANT CHANGE UP
BLD GP AB SCN SERPL QL: SIGNIFICANT CHANGE UP
GLUCOSE BLDC GLUCOMTR-MCNC: 123 MG/DL — HIGH (ref 70–99)
GLUCOSE BLDC GLUCOMTR-MCNC: 123 MG/DL — HIGH (ref 70–99)
GLUCOSE BLDC GLUCOMTR-MCNC: 152 MG/DL — HIGH (ref 70–99)
GLUCOSE BLDC GLUCOMTR-MCNC: 152 MG/DL — HIGH (ref 70–99)
GLUCOSE BLDC GLUCOMTR-MCNC: 164 MG/DL — HIGH (ref 70–99)
GLUCOSE BLDC GLUCOMTR-MCNC: 164 MG/DL — HIGH (ref 70–99)
GLUCOSE BLDC GLUCOMTR-MCNC: 177 MG/DL — HIGH (ref 70–99)
GLUCOSE BLDC GLUCOMTR-MCNC: 177 MG/DL — HIGH (ref 70–99)
GLUCOSE BLDC GLUCOMTR-MCNC: 187 MG/DL — HIGH (ref 70–99)
GLUCOSE BLDC GLUCOMTR-MCNC: 187 MG/DL — HIGH (ref 70–99)
GLUCOSE BLDC GLUCOMTR-MCNC: 188 MG/DL — HIGH (ref 70–99)
GLUCOSE BLDC GLUCOMTR-MCNC: 188 MG/DL — HIGH (ref 70–99)
GLUCOSE BLDC GLUCOMTR-MCNC: 202 MG/DL — HIGH (ref 70–99)
GLUCOSE BLDC GLUCOMTR-MCNC: 202 MG/DL — HIGH (ref 70–99)

## 2023-12-14 PROCEDURE — 88305 TISSUE EXAM BY PATHOLOGIST: CPT | Mod: 26,59

## 2023-12-14 PROCEDURE — 88309 TISSUE EXAM BY PATHOLOGIST: CPT | Mod: 26

## 2023-12-14 PROCEDURE — 88307 TISSUE EXAM BY PATHOLOGIST: CPT | Mod: 26

## 2023-12-14 PROCEDURE — 88342 IMHCHEM/IMCYTCHM 1ST ANTB: CPT | Mod: 26

## 2023-12-14 PROCEDURE — 88112 CYTOPATH CELL ENHANCE TECH: CPT | Mod: 26

## 2023-12-14 PROCEDURE — 88305 TISSUE EXAM BY PATHOLOGIST: CPT | Mod: 26

## 2023-12-14 PROCEDURE — 88341 IMHCHEM/IMCYTCHM EA ADD ANTB: CPT | Mod: 26

## 2023-12-14 DEVICE — VISTASEAL FIBRIN HUMAN 10ML: Type: IMPLANTABLE DEVICE | Status: FUNCTIONAL

## 2023-12-14 RX ORDER — DORZOLAMIDE HYDROCHLORIDE 20 MG/ML
2 SOLUTION/ DROPS OPHTHALMIC
Refills: 0 | DISCHARGE

## 2023-12-14 RX ORDER — OXYCODONE HYDROCHLORIDE 5 MG/1
10 TABLET ORAL EVERY 4 HOURS
Refills: 0 | Status: DISCONTINUED | OUTPATIENT
Start: 2023-12-14 | End: 2023-12-15

## 2023-12-14 RX ORDER — HYDROMORPHONE HYDROCHLORIDE 2 MG/ML
0.5 INJECTION INTRAMUSCULAR; INTRAVENOUS; SUBCUTANEOUS
Refills: 0 | Status: DISCONTINUED | OUTPATIENT
Start: 2023-12-14 | End: 2023-12-14

## 2023-12-14 RX ORDER — ACETAMINOPHEN 500 MG
1000 TABLET ORAL EVERY 6 HOURS
Refills: 0 | Status: DISCONTINUED | OUTPATIENT
Start: 2023-12-14 | End: 2023-12-15

## 2023-12-14 RX ORDER — SENNA PLUS 8.6 MG/1
2 TABLET ORAL AT BEDTIME
Refills: 0 | Status: DISCONTINUED | OUTPATIENT
Start: 2023-12-14 | End: 2023-12-14

## 2023-12-14 RX ORDER — GLUCAGON INJECTION, SOLUTION 0.5 MG/.1ML
1 INJECTION, SOLUTION SUBCUTANEOUS ONCE
Refills: 0 | Status: DISCONTINUED | OUTPATIENT
Start: 2023-12-14 | End: 2023-12-15

## 2023-12-14 RX ORDER — FAMOTIDINE 10 MG/ML
1 INJECTION INTRAVENOUS
Refills: 0 | DISCHARGE

## 2023-12-14 RX ORDER — ONDANSETRON 8 MG/1
4 TABLET, FILM COATED ORAL EVERY 6 HOURS
Refills: 0 | Status: DISCONTINUED | OUTPATIENT
Start: 2023-12-14 | End: 2023-12-15

## 2023-12-14 RX ORDER — OXYCODONE HYDROCHLORIDE 5 MG/1
5 TABLET ORAL EVERY 4 HOURS
Refills: 0 | Status: DISCONTINUED | OUTPATIENT
Start: 2023-12-14 | End: 2023-12-15

## 2023-12-14 RX ORDER — DEXTROSE 50 % IN WATER 50 %
12.5 SYRINGE (ML) INTRAVENOUS ONCE
Refills: 0 | Status: DISCONTINUED | OUTPATIENT
Start: 2023-12-14 | End: 2023-12-15

## 2023-12-14 RX ORDER — DEXTROSE 50 % IN WATER 50 %
25 SYRINGE (ML) INTRAVENOUS ONCE
Refills: 0 | Status: DISCONTINUED | OUTPATIENT
Start: 2023-12-14 | End: 2023-12-15

## 2023-12-14 RX ORDER — INSULIN LISPRO 100/ML
VIAL (ML) SUBCUTANEOUS AT BEDTIME
Refills: 0 | Status: DISCONTINUED | OUTPATIENT
Start: 2023-12-14 | End: 2023-12-15

## 2023-12-14 RX ORDER — IBUPROFEN 200 MG
600 TABLET ORAL EVERY 8 HOURS
Refills: 0 | Status: DISCONTINUED | OUTPATIENT
Start: 2023-12-14 | End: 2023-12-15

## 2023-12-14 RX ORDER — SODIUM CHLORIDE 9 MG/ML
1000 INJECTION, SOLUTION INTRAVENOUS
Refills: 0 | Status: DISCONTINUED | OUTPATIENT
Start: 2023-12-14 | End: 2023-12-14

## 2023-12-14 RX ORDER — HYDROMORPHONE HYDROCHLORIDE 2 MG/ML
1 INJECTION INTRAMUSCULAR; INTRAVENOUS; SUBCUTANEOUS
Refills: 0 | Status: DISCONTINUED | OUTPATIENT
Start: 2023-12-14 | End: 2023-12-14

## 2023-12-14 RX ORDER — CHLORHEXIDINE GLUCONATE 213 G/1000ML
1 SOLUTION TOPICAL ONCE
Refills: 0 | Status: COMPLETED | OUTPATIENT
Start: 2023-12-14 | End: 2023-12-14

## 2023-12-14 RX ORDER — INSULIN LISPRO 100/ML
VIAL (ML) SUBCUTANEOUS
Refills: 0 | Status: DISCONTINUED | OUTPATIENT
Start: 2023-12-14 | End: 2023-12-15

## 2023-12-14 RX ORDER — POLYETHYLENE GLYCOL 3350 17 G/17G
17 POWDER, FOR SOLUTION ORAL DAILY
Refills: 0 | Status: DISCONTINUED | OUTPATIENT
Start: 2023-12-14 | End: 2023-12-15

## 2023-12-14 RX ORDER — SODIUM CHLORIDE 9 MG/ML
1000 INJECTION, SOLUTION INTRAVENOUS
Refills: 0 | Status: DISCONTINUED | OUTPATIENT
Start: 2023-12-14 | End: 2023-12-15

## 2023-12-14 RX ORDER — ATORVASTATIN CALCIUM 80 MG/1
1 TABLET, FILM COATED ORAL
Refills: 0 | DISCHARGE

## 2023-12-14 RX ORDER — SENNA PLUS 8.6 MG/1
2 TABLET ORAL AT BEDTIME
Refills: 0 | Status: DISCONTINUED | OUTPATIENT
Start: 2023-12-14 | End: 2023-12-15

## 2023-12-14 RX ORDER — SODIUM CHLORIDE 9 MG/ML
3 INJECTION INTRAMUSCULAR; INTRAVENOUS; SUBCUTANEOUS EVERY 8 HOURS
Refills: 0 | Status: DISCONTINUED | OUTPATIENT
Start: 2023-12-14 | End: 2023-12-14

## 2023-12-14 RX ORDER — METOPROLOL TARTRATE 50 MG
5 TABLET ORAL EVERY 6 HOURS
Refills: 0 | Status: DISCONTINUED | OUTPATIENT
Start: 2023-12-14 | End: 2023-12-15

## 2023-12-14 RX ORDER — LATANOPROST 0.05 MG/ML
1 SOLUTION/ DROPS OPHTHALMIC; TOPICAL
Refills: 0 | DISCHARGE

## 2023-12-14 RX ORDER — ONDANSETRON 8 MG/1
4 TABLET, FILM COATED ORAL ONCE
Refills: 0 | Status: DISCONTINUED | OUTPATIENT
Start: 2023-12-14 | End: 2023-12-14

## 2023-12-14 RX ORDER — DEXTROSE 50 % IN WATER 50 %
15 SYRINGE (ML) INTRAVENOUS ONCE
Refills: 0 | Status: DISCONTINUED | OUTPATIENT
Start: 2023-12-14 | End: 2023-12-15

## 2023-12-14 RX ORDER — FAMOTIDINE 10 MG/ML
20 INJECTION INTRAVENOUS DAILY
Refills: 0 | Status: DISCONTINUED | OUTPATIENT
Start: 2023-12-14 | End: 2023-12-15

## 2023-12-14 RX ADMIN — Medication 400 MILLIGRAM(S): at 17:52

## 2023-12-14 RX ADMIN — Medication 1000 MILLIGRAM(S): at 23:35

## 2023-12-14 RX ADMIN — Medication 1: at 17:52

## 2023-12-14 RX ADMIN — CHLORHEXIDINE GLUCONATE 1 APPLICATION(S): 213 SOLUTION TOPICAL at 06:47

## 2023-12-14 RX ADMIN — Medication 400 MILLIGRAM(S): at 23:15

## 2023-12-14 NOTE — CHART NOTE - NSCHARTNOTEFT_GEN_A_CORE
Patient evaluated at bedside this evening. Denies complaints. bed rest, Tolerating clear diet  Pt only  using tylenol for pain declined narcotics.     Objective  T(C): 36.7 (12-14-23 @ 20:43), Max: 36.8 (12-14-23 @ 13:42)  HR: 79 (12-14-23 @ 20:43) (67 - 91)  BP: 135/74 (12-14-23 @ 20:43) (124/56 - 146/80)  RR: 18 (12-14-23 @ 20:43) (17 - 20)  SpO2: 98% (12-14-23 @ 20:43) (97% - 100%)  Wt(kg): --    12-14 @ 07:01  -  12-14 @ 23:13  --------------------------------------------------------  IN: 1600 mL / OUT: 1700 mL / NET: -100 mL        Gen: NAD  Abd: Soft NT  Ext: NT BL  incisions: c/d/i    acetaminophen   IVPB .. 1000 milliGRAM(s) IV Intermittent every 6 hours  dextrose 5%. 1000 milliLiter(s) IV Continuous <Continuous>  dextrose 5%. 1000 milliLiter(s) IV Continuous <Continuous>  dextrose 50% Injectable 12.5 Gram(s) IV Push once  dextrose 50% Injectable 25 Gram(s) IV Push once  dextrose 50% Injectable 25 Gram(s) IV Push once  dextrose Oral Gel 15 Gram(s) Oral once PRN  famotidine    Tablet 20 milliGRAM(s) Oral daily  glucagon  Injectable 1 milliGRAM(s) IntraMuscular once  ibuprofen  Tablet. 600 milliGRAM(s) Oral every 8 hours  insulin lispro (ADMELOG) corrective regimen sliding scale   SubCutaneous three times a day before meals  insulin lispro (ADMELOG) corrective regimen sliding scale   SubCutaneous at bedtime  lactated ringers. 1000 milliLiter(s) IV Continuous <Continuous>  metoprolol tartrate Injectable 5 milliGRAM(s) IV Push every 6 hours PRN  ondansetron Injectable 4 milliGRAM(s) IV Push every 6 hours PRN  oxyCODONE    IR 5 milliGRAM(s) Oral every 4 hours PRN  oxyCODONE    IR 10 milliGRAM(s) Oral every 4 hours PRN  polyethylene glycol 3350 17 Gram(s) Oral daily  senna 2 Tablet(s) Oral at bedtime      IMp: 79y/o pod#0 s/p RA TLH/bso, sentinel node biopsy and cystoscopy doing well.     plan- Continue post-op care  cbc/cmp/ mg/phosperus in am  sánchez and vag packing in place- likely d/c in am after labs.   heparin in am post lab evaluation. Patient evaluated at bedside this evening. Denies complaints. bed rest, Tolerating clear diet  Pt only  using tylenol for pain declined narcotics.     Objective  T(C): 36.7 (12-14-23 @ 20:43), Max: 36.8 (12-14-23 @ 13:42)  HR: 79 (12-14-23 @ 20:43) (67 - 91)  BP: 135/74 (12-14-23 @ 20:43) (124/56 - 146/80)  RR: 18 (12-14-23 @ 20:43) (17 - 20)  SpO2: 98% (12-14-23 @ 20:43) (97% - 100%)  Wt(kg): --    12-14 @ 07:01  -  12-14 @ 23:13  --------------------------------------------------------  IN: 1600 mL / OUT: 1700 mL / NET: -100 mL        Gen: NAD  Abd: Soft NT  Ext: NT BL  incisions: c/d/i    acetaminophen   IVPB .. 1000 milliGRAM(s) IV Intermittent every 6 hours  dextrose 5%. 1000 milliLiter(s) IV Continuous <Continuous>  dextrose 5%. 1000 milliLiter(s) IV Continuous <Continuous>  dextrose 50% Injectable 12.5 Gram(s) IV Push once  dextrose 50% Injectable 25 Gram(s) IV Push once  dextrose 50% Injectable 25 Gram(s) IV Push once  dextrose Oral Gel 15 Gram(s) Oral once PRN  famotidine    Tablet 20 milliGRAM(s) Oral daily  glucagon  Injectable 1 milliGRAM(s) IntraMuscular once  ibuprofen  Tablet. 600 milliGRAM(s) Oral every 8 hours  insulin lispro (ADMELOG) corrective regimen sliding scale   SubCutaneous three times a day before meals  insulin lispro (ADMELOG) corrective regimen sliding scale   SubCutaneous at bedtime  lactated ringers. 1000 milliLiter(s) IV Continuous <Continuous>  metoprolol tartrate Injectable 5 milliGRAM(s) IV Push every 6 hours PRN  ondansetron Injectable 4 milliGRAM(s) IV Push every 6 hours PRN  oxyCODONE    IR 5 milliGRAM(s) Oral every 4 hours PRN  oxyCODONE    IR 10 milliGRAM(s) Oral every 4 hours PRN  polyethylene glycol 3350 17 Gram(s) Oral daily  senna 2 Tablet(s) Oral at bedtime      IMp: 77y/o pod#0 s/p RA TLH/bso, sentinel node biopsy and cystoscopy doing well.     plan- Continue post-op care  cbc/cmp/ mg/phosperus in am  sánchez and vag packing in place- likely d/c in am after labs.   heparin in am post lab evaluation.

## 2023-12-14 NOTE — BRIEF OPERATIVE NOTE - NSICDXBRIEFPOSTOP_GEN_ALL_CORE_FT
POST-OP DIAGNOSIS:  Endometrial cancer 14-Dec-2023 14:21:47  Marjan Up  Postmenopausal bleeding 14-Dec-2023 14:21:40  Marjan Up

## 2023-12-14 NOTE — BRIEF OPERATIVE NOTE - NSICDXBRIEFPROCEDURE_GEN_ALL_CORE_FT
PROCEDURES:  Hysterectomy, total, robot-assisted, laparoscopic, using da Robert Xi, w bilat salpingo-oophorectomy, sentinel lymph node biopsy + cystoscopy 14-Dec-2023 14:19:57  Marjan Up  Biopsy, omentum, robot-assisted 14-Dec-2023 14:20:23  Marjan Up

## 2023-12-14 NOTE — ASU PATIENT PROFILE, ADULT - NSICDXPASTMEDICALHX_GEN_ALL_CORE_FT
PAST MEDICAL HISTORY:  Glaucoma     Hyperlipidemia     Malignant neoplasm of endometrium     PMB (postmenopausal bleeding)     Right cataract     Type 2 diabetes, diet controlled

## 2023-12-14 NOTE — ASU PREOP CHECKLIST - WAS PATIENT ON BETA BLOCKER?
----- Message from Joyce Sanchez MD sent at 1/22/2018  7:58 AM EST -----  Please let him know cholesterol labs are the same as they were in 2013 and 2014. No significant change. No statin due to elevated liver tests and on amiodarone. I d/w him PCSK9 inhibitors and we can try if he is willing. Injection 2x per month. He did not want to consider before. Does he want to try to keep things the same? No

## 2023-12-14 NOTE — PATIENT PROFILE ADULT - FALL HARM RISK - RISK INTERVENTIONS
Assistance OOB with selected safe patient handling equipment/Assistance with ambulation/Communicate Fall Risk and Risk Factors to all staff, patient, and family/Monitor gait and stability/Reinforce activity limits and safety measures with patient and family/Sit up slowly, dangle for a short time, stand at bedside before walking/Use of alarms - bed, chair and/or voice tab/Visual Cue: Yellow wristband/Bed in lowest position, wheels locked, appropriate side rails in place/Call bell, personal items and telephone in reach/Instruct patient to call for assistance before getting out of bed or chair/Non-slip footwear when patient is out of bed/[place] to call system/Physically safe environment - no spills, clutter or unnecessary equipment/Purposeful Proactive Rounding/Room/bathroom lighting operational, light cord in reach Assistance OOB with selected safe patient handling equipment/Assistance with ambulation/Communicate Fall Risk and Risk Factors to all staff, patient, and family/Monitor gait and stability/Reinforce activity limits and safety measures with patient and family/Sit up slowly, dangle for a short time, stand at bedside before walking/Use of alarms - bed, chair and/or voice tab/Visual Cue: Yellow wristband/Bed in lowest position, wheels locked, appropriate side rails in place/Call bell, personal items and telephone in reach/Instruct patient to call for assistance before getting out of bed or chair/Non-slip footwear when patient is out of bed/[place] to call system/Physically safe environment - no spills, clutter or unnecessary equipment/Purposeful Proactive Rounding/Room/bathroom lighting operational, light cord in reach

## 2023-12-14 NOTE — BRIEF OPERATIVE NOTE - OPERATION/FINDINGS
EUA revealed small, atrophic vagina. 16 week sized uterus, mobile. No palpable masses in bilateral adnexa. Nonparous cervix, appears small and grossly normal. Dilated without difficulty. Active oozing of blood and tissue content from cervical os.   Laparoscopic survey of abdomen reveals normal upper abdomen. Omental adhesions to anterior abdominal wall in RUQ, taken down by Ligasure with good hemostasis. Large, globular appearing uterus. Grossly normal bilateral fallopian tubes and bilateral ovaries.\  Cystoscopy revealed intact bladder with bubble-sign at dome. Bilateral UOs visualized with brisk ureteral jets. EUA revealed small, atrophic vagina. 16 week sized broad based uterus with limited mobility. No palpable masses in bilateral adnexa. Nonparous cervix, appears small and grossly normal. Extremely narrow vaginal introitus. Cervix dilated without difficulty. Active oozing of blood and tissue content from cervical os prior to any manipulation and dilation.  Laparoscopic survey of abdomen reveals normal upper abdomen albeit with omental adhesions to anterior abdominal wall in RUQ, taken down by Ligasure with good hemostasis. Large, globular appearing uterus. Grossly normal bilateral fallopian tubes and bilateral ovaries. Extreme difficulty in the procedure due to the large uterus and friability of the uterus.   Cystoscopy revealed intact bladder with bubble-sign at dome. Bilateral UOs visualized with brisk ureteral jets.    Please see operative report for more details.

## 2023-12-14 NOTE — ASU PATIENT PROFILE, ADULT - FALL HARM RISK - UNIVERSAL INTERVENTIONS
Bed in lowest position, wheels locked, appropriate side rails in place/Call bell, personal items and telephone in reach/Instruct patient to call for assistance before getting out of bed or chair/Non-slip footwear when patient is out of bed/[place] to call system/Physically safe environment - no spills, clutter or unnecessary equipment/Purposeful Proactive Rounding/Room/bathroom lighting operational, light cord in reach Bed in lowest position, wheels locked, appropriate side rails in place/Call bell, personal items and telephone in reach/Instruct patient to call for assistance before getting out of bed or chair/Non-slip footwear when patient is out of bed/[place] to call system/Physically safe environment - no spills, clutter or unnecessary equipment/Purposeful Proactive Rounding/Room/bathroom lighting operational, light cord in reach

## 2023-12-14 NOTE — BRIEF OPERATIVE NOTE - NSICDXBRIEFPREOP_GEN_ALL_CORE_FT
PRE-OP DIAGNOSIS:  Postmenopausal bleeding 14-Dec-2023 14:21:28  Marjan Up  Endometrial cancer 14-Dec-2023 14:21:35  Marjan Up

## 2023-12-14 NOTE — BRIEF OPERATIVE NOTE - SPECIMENS
uterus with cervix and bilateral tubes and ovaries, left sentinel pelvic lymph node, right sentinel pelvic lymph node, omental biopsy

## 2023-12-15 ENCOUNTER — TRANSCRIPTION ENCOUNTER (OUTPATIENT)
Age: 78
End: 2023-12-15

## 2023-12-15 VITALS
SYSTOLIC BLOOD PRESSURE: 151 MMHG | OXYGEN SATURATION: 100 % | DIASTOLIC BLOOD PRESSURE: 73 MMHG | RESPIRATION RATE: 17 BRPM | HEART RATE: 70 BPM | TEMPERATURE: 98 F

## 2023-12-15 DIAGNOSIS — Z90.710 ACQUIRED ABSENCE OF BOTH CERVIX AND UTERUS: ICD-10-CM

## 2023-12-15 LAB
ANION GAP SERPL CALC-SCNC: 5 MMOL/L — SIGNIFICANT CHANGE UP (ref 5–17)
ANION GAP SERPL CALC-SCNC: 5 MMOL/L — SIGNIFICANT CHANGE UP (ref 5–17)
BASOPHILS # BLD AUTO: 0.02 K/UL — SIGNIFICANT CHANGE UP (ref 0–0.2)
BASOPHILS # BLD AUTO: 0.02 K/UL — SIGNIFICANT CHANGE UP (ref 0–0.2)
BASOPHILS NFR BLD AUTO: 0.2 % — SIGNIFICANT CHANGE UP (ref 0–2)
BASOPHILS NFR BLD AUTO: 0.2 % — SIGNIFICANT CHANGE UP (ref 0–2)
BUN SERPL-MCNC: 5 MG/DL — LOW (ref 7–18)
BUN SERPL-MCNC: 5 MG/DL — LOW (ref 7–18)
CALCIUM SERPL-MCNC: 8.2 MG/DL — LOW (ref 8.4–10.5)
CALCIUM SERPL-MCNC: 8.2 MG/DL — LOW (ref 8.4–10.5)
CHLORIDE SERPL-SCNC: 107 MMOL/L — SIGNIFICANT CHANGE UP (ref 96–108)
CHLORIDE SERPL-SCNC: 107 MMOL/L — SIGNIFICANT CHANGE UP (ref 96–108)
CO2 SERPL-SCNC: 29 MMOL/L — SIGNIFICANT CHANGE UP (ref 22–31)
CO2 SERPL-SCNC: 29 MMOL/L — SIGNIFICANT CHANGE UP (ref 22–31)
CREAT SERPL-MCNC: 0.75 MG/DL — SIGNIFICANT CHANGE UP (ref 0.5–1.3)
CREAT SERPL-MCNC: 0.75 MG/DL — SIGNIFICANT CHANGE UP (ref 0.5–1.3)
EGFR: 81 ML/MIN/1.73M2 — SIGNIFICANT CHANGE UP
EGFR: 81 ML/MIN/1.73M2 — SIGNIFICANT CHANGE UP
EOSINOPHIL # BLD AUTO: 0.02 K/UL — SIGNIFICANT CHANGE UP (ref 0–0.5)
EOSINOPHIL # BLD AUTO: 0.02 K/UL — SIGNIFICANT CHANGE UP (ref 0–0.5)
EOSINOPHIL NFR BLD AUTO: 0.2 % — SIGNIFICANT CHANGE UP (ref 0–6)
EOSINOPHIL NFR BLD AUTO: 0.2 % — SIGNIFICANT CHANGE UP (ref 0–6)
GLUCOSE BLDC GLUCOMTR-MCNC: 121 MG/DL — HIGH (ref 70–99)
GLUCOSE BLDC GLUCOMTR-MCNC: 121 MG/DL — HIGH (ref 70–99)
GLUCOSE SERPL-MCNC: 123 MG/DL — HIGH (ref 70–99)
GLUCOSE SERPL-MCNC: 123 MG/DL — HIGH (ref 70–99)
HCT VFR BLD CALC: 33.1 % — LOW (ref 34.5–45)
HCT VFR BLD CALC: 33.1 % — LOW (ref 34.5–45)
HGB BLD-MCNC: 10.8 G/DL — LOW (ref 11.5–15.5)
HGB BLD-MCNC: 10.8 G/DL — LOW (ref 11.5–15.5)
IMM GRANULOCYTES NFR BLD AUTO: 0.5 % — SIGNIFICANT CHANGE UP (ref 0–0.9)
IMM GRANULOCYTES NFR BLD AUTO: 0.5 % — SIGNIFICANT CHANGE UP (ref 0–0.9)
LYMPHOCYTES # BLD AUTO: 1.83 K/UL — SIGNIFICANT CHANGE UP (ref 1–3.3)
LYMPHOCYTES # BLD AUTO: 1.83 K/UL — SIGNIFICANT CHANGE UP (ref 1–3.3)
LYMPHOCYTES # BLD AUTO: 14.6 % — SIGNIFICANT CHANGE UP (ref 13–44)
LYMPHOCYTES # BLD AUTO: 14.6 % — SIGNIFICANT CHANGE UP (ref 13–44)
MAGNESIUM SERPL-MCNC: 2 MG/DL — SIGNIFICANT CHANGE UP (ref 1.6–2.6)
MAGNESIUM SERPL-MCNC: 2 MG/DL — SIGNIFICANT CHANGE UP (ref 1.6–2.6)
MCHC RBC-ENTMCNC: 29 PG — SIGNIFICANT CHANGE UP (ref 27–34)
MCHC RBC-ENTMCNC: 29 PG — SIGNIFICANT CHANGE UP (ref 27–34)
MCHC RBC-ENTMCNC: 32.6 GM/DL — SIGNIFICANT CHANGE UP (ref 32–36)
MCHC RBC-ENTMCNC: 32.6 GM/DL — SIGNIFICANT CHANGE UP (ref 32–36)
MCV RBC AUTO: 89 FL — SIGNIFICANT CHANGE UP (ref 80–100)
MCV RBC AUTO: 89 FL — SIGNIFICANT CHANGE UP (ref 80–100)
MONOCYTES # BLD AUTO: 0.51 K/UL — SIGNIFICANT CHANGE UP (ref 0–0.9)
MONOCYTES # BLD AUTO: 0.51 K/UL — SIGNIFICANT CHANGE UP (ref 0–0.9)
MONOCYTES NFR BLD AUTO: 4.1 % — SIGNIFICANT CHANGE UP (ref 2–14)
MONOCYTES NFR BLD AUTO: 4.1 % — SIGNIFICANT CHANGE UP (ref 2–14)
NEUTROPHILS # BLD AUTO: 10.1 K/UL — HIGH (ref 1.8–7.4)
NEUTROPHILS # BLD AUTO: 10.1 K/UL — HIGH (ref 1.8–7.4)
NEUTROPHILS NFR BLD AUTO: 80.4 % — HIGH (ref 43–77)
NEUTROPHILS NFR BLD AUTO: 80.4 % — HIGH (ref 43–77)
NRBC # BLD: 0 /100 WBCS — SIGNIFICANT CHANGE UP (ref 0–0)
NRBC # BLD: 0 /100 WBCS — SIGNIFICANT CHANGE UP (ref 0–0)
PHOSPHATE SERPL-MCNC: 3.1 MG/DL — SIGNIFICANT CHANGE UP (ref 2.5–4.5)
PHOSPHATE SERPL-MCNC: 3.1 MG/DL — SIGNIFICANT CHANGE UP (ref 2.5–4.5)
PLATELET # BLD AUTO: 235 K/UL — SIGNIFICANT CHANGE UP (ref 150–400)
PLATELET # BLD AUTO: 235 K/UL — SIGNIFICANT CHANGE UP (ref 150–400)
POTASSIUM SERPL-MCNC: 3.6 MMOL/L — SIGNIFICANT CHANGE UP (ref 3.5–5.3)
POTASSIUM SERPL-MCNC: 3.6 MMOL/L — SIGNIFICANT CHANGE UP (ref 3.5–5.3)
POTASSIUM SERPL-SCNC: 3.6 MMOL/L — SIGNIFICANT CHANGE UP (ref 3.5–5.3)
POTASSIUM SERPL-SCNC: 3.6 MMOL/L — SIGNIFICANT CHANGE UP (ref 3.5–5.3)
RBC # BLD: 3.72 M/UL — LOW (ref 3.8–5.2)
RBC # BLD: 3.72 M/UL — LOW (ref 3.8–5.2)
RBC # FLD: 12.8 % — SIGNIFICANT CHANGE UP (ref 10.3–14.5)
RBC # FLD: 12.8 % — SIGNIFICANT CHANGE UP (ref 10.3–14.5)
SODIUM SERPL-SCNC: 141 MMOL/L — SIGNIFICANT CHANGE UP (ref 135–145)
SODIUM SERPL-SCNC: 141 MMOL/L — SIGNIFICANT CHANGE UP (ref 135–145)
WBC # BLD: 12.54 K/UL — HIGH (ref 3.8–10.5)
WBC # BLD: 12.54 K/UL — HIGH (ref 3.8–10.5)
WBC # FLD AUTO: 12.54 K/UL — HIGH (ref 3.8–10.5)
WBC # FLD AUTO: 12.54 K/UL — HIGH (ref 3.8–10.5)

## 2023-12-15 PROCEDURE — 88360 TUMOR IMMUNOHISTOCHEM/MANUAL: CPT

## 2023-12-15 PROCEDURE — 88342 IMHCHEM/IMCYTCHM 1ST ANTB: CPT

## 2023-12-15 PROCEDURE — 36415 COLL VENOUS BLD VENIPUNCTURE: CPT

## 2023-12-15 PROCEDURE — 86850 RBC ANTIBODY SCREEN: CPT

## 2023-12-15 PROCEDURE — 82962 GLUCOSE BLOOD TEST: CPT

## 2023-12-15 PROCEDURE — 85025 COMPLETE CBC W/AUTO DIFF WBC: CPT

## 2023-12-15 PROCEDURE — 86900 BLOOD TYPING SEROLOGIC ABO: CPT

## 2023-12-15 PROCEDURE — 83735 ASSAY OF MAGNESIUM: CPT

## 2023-12-15 PROCEDURE — 86901 BLOOD TYPING SEROLOGIC RH(D): CPT

## 2023-12-15 PROCEDURE — C1889: CPT

## 2023-12-15 PROCEDURE — 84100 ASSAY OF PHOSPHORUS: CPT

## 2023-12-15 PROCEDURE — 88307 TISSUE EXAM BY PATHOLOGIST: CPT

## 2023-12-15 PROCEDURE — S2900: CPT

## 2023-12-15 PROCEDURE — 88341 IMHCHEM/IMCYTCHM EA ADD ANTB: CPT

## 2023-12-15 PROCEDURE — 88305 TISSUE EXAM BY PATHOLOGIST: CPT

## 2023-12-15 PROCEDURE — 86923 COMPATIBILITY TEST ELECTRIC: CPT

## 2023-12-15 PROCEDURE — 80048 BASIC METABOLIC PNL TOTAL CA: CPT

## 2023-12-15 PROCEDURE — 88309 TISSUE EXAM BY PATHOLOGIST: CPT

## 2023-12-15 PROCEDURE — 88112 CYTOPATH CELL ENHANCE TECH: CPT

## 2023-12-15 RX ORDER — ACETAMINOPHEN 500 MG
2 TABLET ORAL
Qty: 30 | Refills: 0
Start: 2023-12-15 | End: 2023-12-19

## 2023-12-15 RX ORDER — IBUPROFEN 200 MG
1 TABLET ORAL
Qty: 20 | Refills: 0
Start: 2023-12-15 | End: 2023-12-19

## 2023-12-15 RX ADMIN — Medication 1000 MILLIGRAM(S): at 06:43

## 2023-12-15 RX ADMIN — Medication 400 MILLIGRAM(S): at 06:23

## 2023-12-15 NOTE — DISCHARGE NOTE PROVIDER - NSDCCPCAREPLAN_GEN_ALL_CORE_FT
PRINCIPAL DISCHARGE DIAGNOSIS  Diagnosis: Endometrial cancer  Assessment and Plan of Treatment: s/p robotic assisted abdominal hysterectomy and BSO

## 2023-12-15 NOTE — DISCHARGE NOTE NURSING/CASE MANAGEMENT/SOCIAL WORK - PATIENT PORTAL LINK FT
You can access the FollowMyHealth Patient Portal offered by [facility] by registering at the following website: [email or web address]. By joining [employer]’s FollowMyHealth portal, you will also be able to view your health information using other applications (apps) compatible with our system. You can access the FollowMyHealth Patient Portal offered by [facility] by registering at the following website: [email or web address]. By joining [employer]’s FollowMyHealth portal, you will also be able to view your health information using other applications (apps) compatible with our system.

## 2023-12-15 NOTE — PROGRESS NOTE ADULT - PROBLEM SELECTOR PLAN 1
Labs reviewed, pt is currently stable    Reyes removed, pt due to void  continue pain management as needed   continue regular diet as tolerated   pt due to ambulate     Pt seen with SUJEY Coronado

## 2023-12-15 NOTE — DISCHARGE NOTE NURSING/CASE MANAGEMENT/SOCIAL WORK - NSDCPEFALRISK_GEN_ALL_CORE
For information on Fall & Injury Prevention, visit: [email or web address]-[email or web address]-mobility OR  [email or web address]-injury OR  https://www.cdc.gov/steadi/patient.html For information on Fall & Injury Prevention, visit: [email or web address]-[email or web address]-mobility OR  [email or web address]://[email or web address]/[email or web address]-prevention-tips-to-avoid-injury OR  https://www.cdc.gov/steadi/patient.html

## 2023-12-15 NOTE — DISCHARGE NOTE PROVIDER - HOSPITAL COURSE
scheduled robotic assisted hysterectomy and bilateral salpingo-oophorectomy with sentinal node biopsy and cystoscopy; uncomplicated surgical procedure; uncomplicated postop care  and pain mgmt; Pt met all postop milestones on POD1; discharged in stable condition   scheduled robotic assisted hysterectomy and bilateral salpingo-oophorectomy with sentinal node biopsy and cystoscopy; uncomplicated surgical procedure; uncomplicated postop care  and pain mgmt; Pt met all postop milestones on POD1 (ambulating, tolerating PO, voiding freely, pain well controlled with PO pain medication).  Discharged in stable condition with close interval follow up with Dr. Astorga.      Deja Aguila MD

## 2023-12-15 NOTE — DISCHARGE NOTE PROVIDER - REASON FOR ADMISSION
scheduled robotic assisted hysterectomy and bilateral salpingo-oophorectomy with sentinal node biopsy and cystoscopy

## 2023-12-15 NOTE — DISCHARGE NOTE PROVIDER - CARE PROVIDER_API CALL
Deja Aguila  Gynecologic Oncology  [address] [place] [address]  Phone: [phone]  Fax: [phone]  Follow Up Time:    Deja Aguila  Gynecologic Oncology  [address] [place] [address]  Phone: [phone]  Fax: [phone]  Follow Up Time:

## 2023-12-15 NOTE — CHART NOTE - NSCHARTNOTEFT_GEN_A_CORE
Pt seen with Dr. Aguila  Ambulating within room, +void s/p sánchez removal this morning.  Tolerated breakfast.   Appears happy and comfortable, no complaints.    Abd soft, NT/ND; +BS appreciated  Incision dressings removed, intact with dermabond, no erythema or wound drainage or bleeding.  No vaginal bleeding , no vaginal packing     Plan for discharge this afternoon.  DC instructions verbalized,   pain mgmt discussed for home meds and f/u in 3 weeks (office will contact her for the appt)  All questions answered. Pt cleared. Pt seen with Dr. Aguila  Ambulating within room, +void s/p sánchez removal this morning.  Tolerated breakfast.   Appears happy and comfortable, no complaints.    Abd soft, NT/ND; +BS appreciated  Incision dressings removed, intact with dermabond, no erythema or wound drainage or bleeding.  No vaginal bleeding , no vaginal packing     Plan for discharge this afternoon.  DC instructions verbalized,   pain mgmt discussed for home meds and f/u in 3 weeks (office will contact her for the appt)  All questions answered. Pt cleared.      Gyn Onc Attending Addendum    Patient seen at bedside with SUJEY Coronado with approximately 8:30 am this morning. Doing well, actively ambulating. Pain well controlled with PO Tylenol. S/p sánchez removal, follow up TOV. Tolerating PO, passing flatus. Labs reviewed, AVSS. Plan for discharge given patient meeting all postoperative milestones. Pain management options discussed upon discharge. All questions answered per patient satisfaction.    Deja Aguila MD

## 2023-12-15 NOTE — PROGRESS NOTE ADULT - SUBJECTIVE AND OBJECTIVE BOX
78y POD#1 S/P robotic assisted lap hysterectomy and BSO, sentinal node biopsy and cystoscopy   Patient seen at bedside resting comfortably offers no new complaints. Pt notes pain is well managed.  sánchez in place, + flatus; tolerating regular diet. Denies HA, CP, SOB, N/V/D,  no bm; dizziness, palpitations, worsening abdominal pain, worsening vaginal bleeding, or any other concerns.     Vital Signs Last 24 Hrs  T(C): 36.7 (15 Dec 2023 05:49), Max: 36.8 (14 Dec 2023 13:42)  T(F): 98.1 (15 Dec 2023 05:49), Max: 98.2 (14 Dec 2023 13:42)  HR: 70 (15 Dec 2023 05:49) (63 - 91)  BP: 151/73 (15 Dec 2023 05:49) (124/56 - 151/73)  BP(mean): 86 (14 Dec 2023 14:42) (76 - 86)  RR: 17 (15 Dec 2023 05:49) (17 - 20)  SpO2: 100% (15 Dec 2023 05:49) (97% - 100%)    Parameters below as of 15 Dec 2023 05:49  Patient On (Oxygen Delivery Method): room air        Gen: A&O x 3, NAD  Chest: CTA B/L  Cardiac: S1,S2  RRR  Abdomen: +BS; soft; Nontender, nondistended, incision dressing clean, dry, intact    Gyn: no vaginal bleeding   Extremities: Nontender, no worsening edema                          10.8   12.54 )-----------( 235      ( 15 Dec 2023 05:23 )             33.1     12-15    141  |  107  |  5<L>  ----------------------------<  123<H>  3.6   |  29  |  0.75    Ca    8.2<L>      15 Dec 2023 05:23  Phos  3.1     12-15  Mg     2.0     12-15        
Post Op Note    Patient seen resting comfortably.  No current complaints.  Denies HA, CP, SOB, dizziness, palpitations, worsening abdominal pain, worsening vaginal bleeding or any other concerns.  Sánchez in place draining clear adequate urine.     Vital Signs Last 24 Hrs  T(C): 36.6 (14 Dec 2023 15:34), Max: 37.2 (14 Dec 2023 06:41)  T(F): 97.8 (14 Dec 2023 15:34), Max: 99 (14 Dec 2023 06:41)  HR: 91 (14 Dec 2023 15:34) (67 - 108)  BP: 146/80 (14 Dec 2023 15:34) (124/56 - 146/80)  BP(mean): 86 (14 Dec 2023 14:42) (76 - 86)  RR: 18 (14 Dec 2023 15:34) (16 - 20)  SpO2: 97% (14 Dec 2023 15:34) (97% - 100%)    Parameters below as of 14 Dec 2023 15:34  Patient On (Oxygen Delivery Method): room air        Gen: A&O x3, NAD  Chest: CTABL  Cardiac: S1+S2+ RRR  Abdomen: Soft, nontender, +BS, nondistended, dressings clean/dry/intact  Gyn: No active bleeding  Extremities: Nontender, no worsening edema, DTRS 2+, venodynes intact bilaterally       A/P:  78 year old female s/p robotic assisted laparoscopic hysterectomy/BSO, sentinel lymph node bx and cystoscopy.  POD #0.     - Pain management  - Advance as per protocol  - OOB and ambulate in am  - DC sánchez and vaginal packing in am  - Labs in am  - Home meds ordered  - Continue current care     Dr Celeste Astorga aware

## 2023-12-15 NOTE — PROGRESS NOTE ADULT - NS ATTEND AMEND GEN_ALL_CORE FT
Gyn Onc Attending Addendum    Patient seen at bedside with SUJEY Coronado with approximately 8:30 am this morning. Doing well, actively ambulating. Pain well controlled with PO Tylenol. S/p sánchez removal, follow up TOV. Tolerating PO, passing flatus. Labs reviewed, AVSS. Plan for discharge given patient meeting all postoperative milestones. All questions answered per patient satisfaction.    Djea Aguila MD Gyn Onc Attending Addendum    Patient seen at bedside with SUJEY Coronado with approximately 8:30 am this morning. Doing well, actively ambulating. Pain well controlled with PO Tylenol. S/p sánchez removal, follow up TOV. Tolerating PO, passing flatus. Labs reviewed, AVSS. Plan for discharge given patient meeting all postoperative milestones. All questions answered per patient satisfaction.    Deja Aguila MD

## 2023-12-15 NOTE — DISCHARGE NOTE PROVIDER - NSDCFUADDINST_GEN_ALL_CORE_FT
Pelvic rest for 5-6 weeks, nothing in vagina. Avoid heavy lifting, no strenuous activities. Motrin/tylenol as needed for pain. Regular diet as tolerated. Keep incision clean and dry. Shower only.   Follow up in office 3weeks.

## 2023-12-15 NOTE — DISCHARGE NOTE PROVIDER - NSDCFUSCHEDAPPT_GEN_ALL_CORE_FT
[facility]  [facility] [address] [facility]  Scheduled Appointment: 12/29/2023     [facility]  [facility] [address] [facility]  Scheduled Appointment: 12/29/2023

## 2023-12-18 ENCOUNTER — NON-APPOINTMENT (OUTPATIENT)
Age: 78
End: 2023-12-18

## 2023-12-20 LAB
NON-GYNECOLOGICAL CYTOLOGY STUDY: SIGNIFICANT CHANGE UP
NON-GYNECOLOGICAL CYTOLOGY STUDY: SIGNIFICANT CHANGE UP

## 2023-12-27 LAB
SURGICAL PATHOLOGY STUDY: SIGNIFICANT CHANGE UP
SURGICAL PATHOLOGY STUDY: SIGNIFICANT CHANGE UP

## 2023-12-29 ENCOUNTER — APPOINTMENT (OUTPATIENT)
Dept: GYNECOLOGIC ONCOLOGY | Facility: CLINIC | Age: 78
End: 2023-12-29
Payer: MEDICARE

## 2023-12-29 VITALS
DIASTOLIC BLOOD PRESSURE: 78 MMHG | HEART RATE: 79 BPM | SYSTOLIC BLOOD PRESSURE: 160 MMHG | BODY MASS INDEX: 19.35 KG/M2 | WEIGHT: 102.5 LBS | HEIGHT: 61 IN

## 2023-12-29 PROCEDURE — 99024 POSTOP FOLLOW-UP VISIT: CPT

## 2023-12-29 NOTE — DISCUSSION/SUMMARY
[Clean] : was clean [Dry] : was dry [Intact] : was intact [None] : had no drainage [Normal Skin] : normal appearance [Normal Skin Turgor] : normal skin turgor [Doing Well] : is doing well [Excellent Pain Control] : has excellent pain control [No Sign of Infection] : is showing no signs of infection [Erythema] : was not erythematous [Ecchymosis] : was not ecchymotic [Firm] : soft [Tender] : nontender [Abnormal Bowel Sounds] : normal bowel sounds [Rebound] : no rebound tenderness [Guarding] : no guarding [Vaginal Exam Abnormal] : normal vaginal exam [de-identified] : marked vaginal atrophy noted with agglutination of the vaginal vault but intact vaginal cuff

## 2023-12-29 NOTE — REASON FOR VISIT
[Post Op] : post op visit [de-identified] : 12/14/2023 [de-identified] : Examination under anesthesia, robotic-assisted total hysterectomy, bilateral salpingo-oophorectomy, sentinel lymph node mapping, bilateral pelvic sentinel lymph node dissection, extensive lysis of adhesions, bilateral  ureterolysis, left adnexal adhesiolysis, omental biopsy, cystoscopy, bilateral transversus abdominis plane block, suction dilation and curettage, repair of vaginal laceration.   [de-identified] : Denies f/c/n/v/d/vaginal bleeding.  Overall feels well.  Meeting milestones of recovery.  Regular BM and voiding freely. We reviewed her pathology below in detail together today. The only pending part is some further evaluation of her sentinel lymph nodes. We dicsussed that she will need systemic treatment and possibly radiation treatment. Her case will be reviewed at our TB meeting this week after which we will discuss the results and final plan in detail.    Final path: 1.  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 See comment.  2.  Uterus, cervix, bilateral fallopian tubes and ovaries; robotically assisted laparoscopic hysterectomy, bilateral salpingo-oophorectomy: -   Uterine carcinosarcoma. See comment and synoptic report. -   Left ovary with benign serous cyst measuring 1.3 cm.  3.  Left sentinel pelvic lymph node; excision: -   Three lymph nodes negative for malignancy on H T E stains, 0/3. -   Cytokeratin AE1/3 to identify isolated tumor cells will follow in an addendum.  4.  Right sentinel pelvic lymph node; excision: -   One lymph node negative for malignancy on H T E stains, 0/3. -   Cytokeratin AE1/3 to identify isolated tumor cells will follow in an addendum.  5.  Omentum; biopsy: -  Mature adipose tissue with foci involved by acute and chronic inflammation, negative for metastatic malignancy.

## 2023-12-29 NOTE — ASSESSMENT
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   - Patients history and work up to date reviewed in detail. - Doing well, meeting all postoperative milestones. - Postoperative recovery and expectations reviewed again in detail. - Final pathology report reviewed in detail.The only pending part is some further evaluation of her sentinel lymph nodes. We dicsussed that she will need systemic treatment and possibly radiation treatment. Her case will be reviewed at our TB meeting this week after which we will discuss the results and final plan in detail.   - To review again via vteb but will have follow up visit in person prior to initiation of her adjvuatnt treatment.  - I spent the time noted on the day of this patient encounter preparing for, providing and documenting the above service. I have counseled and educated the patient on the differential, workup, disease course, and treatment/management plan. Education was provided to the patient during this encounter. All questions and concerns were answered and addressed in detail.

## 2023-12-29 NOTE — LETTER BODY
[Dear  ___] : Dear  [unfilled], [I recently saw our patient [unfilled] for a follow-up visit.] : I recently saw our patient, [unfilled] for a follow-up visit. [Attached please find my note.] : Attached please find my note. [FreeTextEntry2] :   Pt is s/p Examination under anesthesia, robotic-assisted total hysterectomy, bilateral salpingo-oophorectomy, sentinel lymph node mapping, bilateral pelvic sentinel lymph node dissection, extensive lysis of adhesions, bilateral  ureterolysis, left adnexal adhesiolysis, omental biopsy, cystoscopy, bilateral transversus abdominis plane block, suction dilation and curettage, repair of vaginal laceration on 12/14/23   [FreeTextEntry1] : final path

## 2024-01-02 PROBLEM — C54.1 MALIGNANT NEOPLASM OF ENDOMETRIUM: Chronic | Status: ACTIVE | Noted: 2023-12-05

## 2024-01-03 ENCOUNTER — NON-APPOINTMENT (OUTPATIENT)
Age: 79
End: 2024-01-03

## 2024-01-03 ENCOUNTER — APPOINTMENT (OUTPATIENT)
Dept: GYNECOLOGIC ONCOLOGY | Facility: CLINIC | Age: 79
End: 2024-01-03
Payer: MEDICARE

## 2024-01-03 PROCEDURE — 99024 POSTOP FOLLOW-UP VISIT: CPT

## 2024-01-03 NOTE — PAST MEDICAL HISTORY
[Postmenopausal] : The patient is postmenopausal [Menarche Age ____] : age at menarche was [unfilled] [Menopause Age____] : age at menopause was [unfilled] [Total Preg ___] : G[unfilled] [de-identified] : virgin

## 2024-01-03 NOTE — HISTORY OF PRESENT ILLNESS
[FreeTextEntry1] : **Please note that this visit was converted to telemed due to technical difficulties** The patients permission was obtained to proceed with the vteb.   GYN/Ref:  Dr. eHadley  Ms. Auguste, 78 years old, referred for carcinosarcoma identified on hysteroscopy, D&C on 10/24/2023.  Pt has been having pmb since 23.    Denies f/c/n/v/d/changes to bowel or bladder habits.  She notes approximately a 5 pound unintentional weight loss over the past few months.  She does report pelvic pressure, bloating, occasional low back pain.  Denies any other associated symptoms.  We discussed in detail her endometrial sampling results revealing high-grade endometrial cancer (carcinosarcoma).  We discussed in detail the recommendation for further work-up and surgical management if no evidence of extrauterine disease is found.   CT C/A/P was done on 23 which revealed no evidence of extrauterine disease that would preclude surgery. 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 I recently saw her last week for a postoperative check with ultrastaining from her SLNM's still pending. We presented today via vteb to discuss the final path results again and the interdisciplinary TB recommendations. Final path revealed negative SLN invovlement. We discussed the TB recommendation for somatic tumor testing, obtaining HER2 status and recommendation for systemic treatment. The patient is unsure if she is willing to accept chemotherapy and/or immunotherapy. She is planning to talk to her family further and is amenable to consultation with medical oncology. Today, she is without compiaint and notes she is doing well.   Pathology: 10/25/2023 ([facility]) 1.Endocervix, curettings - Ecto and endocervical tissue with reactive and metaplastic change 2. "Rule out calcified fibroid versus endometrial carcinoma", biopsy - Endometrial carcinoma compatible with carcinosarcoma - Endocervical tissue with reactive and metaplastic change MLH1:   Intact nuclear expression MSH2:   Intact nuclear expression MSH6:   Intact nuclear expression PMS2:   Intact nuclear expression  Imaging: 10/4/2023 TVUS (office ultrasound) Uterus: 8.94 x 6.03 cm Endometrium: Second cystic mixed echogenic 39 mm Ovaries not visualized.  POB:  virginal PGYN: Menarche age 15;  LMP age 52 PMH: glaucoma; cataract, GERD. HLD Meds:    latanoprost eyedrops, atorvastatin, famotidine, allegra, MVI, Vit C + Iron, Septane Allergies:NKDA PSH: D&C hysteroscopy;  left cataract surgery .   Social Hx: lives alone; no children; non-smoker, no alcohol, no drugs FH: Mother - palate/tongue cancer age 56  Health Maintenance: Mammogram:  >5 years ago Colonoscopy: 5 years ago.   PAP:  10/4/2023: Unsatisfactory for evaluation (David)

## 2024-01-03 NOTE — ASSESSMENT
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

## 2024-01-03 NOTE — LETTER BODY
[Dear  ___] : Dear  [unfilled], [I had the pleasure of evaluating your patient, [unfilled] for ___] : I had the pleasure of evaluating your patient, [unfilled] for [unfilled]. [Attached please find my note.] : Attached please find my note. [FreeTextEntry1] : Labs, imaging

## 2024-01-08 ENCOUNTER — NON-APPOINTMENT (OUTPATIENT)
Age: 79
End: 2024-01-08

## 2024-01-15 ENCOUNTER — NON-APPOINTMENT (OUTPATIENT)
Age: 79
End: 2024-01-15

## 2024-01-19 ENCOUNTER — APPOINTMENT (OUTPATIENT)
Dept: GYNECOLOGIC ONCOLOGY | Facility: CLINIC | Age: 79
End: 2024-01-19
Payer: MEDICARE

## 2024-01-19 VITALS
HEIGHT: 61 IN | WEIGHT: 100 LBS | SYSTOLIC BLOOD PRESSURE: 186 MMHG | DIASTOLIC BLOOD PRESSURE: 94 MMHG | HEART RATE: 114 BPM | BODY MASS INDEX: 18.88 KG/M2

## 2024-01-19 PROCEDURE — 99024 POSTOP FOLLOW-UP VISIT: CPT

## 2024-01-20 NOTE — REASON FOR VISIT
[Post Op] : post op visit [de-identified] : 12/14/2023 [de-identified] : Examination under anesthesia, robotic-assisted total hysterectomy, bilateral salpingo-oophorectomy, sentinel lymph node mapping, bilateral pelvic sentinel lymph node dissection, extensive lysis of adhesions, bilateral  ureterolysis, left adnexal adhesiolysis, omental biopsy, cystoscopy, bilateral transversus abdominis plane block, suction dilation and curettage, repair of vaginal laceration.   [de-identified] : Denies f/c/n/v/d/vaginal bleeding.  Overall feels well.  Meeting milestones of recovery.  Regular BM and voiding freely. I recently saw her on 1/3/24 for a postoperative check. We presented again today  to discuss the final path results again and the interdisciplinary TB recommendations. Final path revealed negative SLN invovlement. We discussed the TB recommendation for somatic tumor testing, obtaining HER2 status and recommendation for systemic treatment. The patient is unsure if she is willing to accept chemotherapy and/or immunotherapy. She has ongoing discussions with her family and is amenable to consultation with medical oncology with her appt on 2/9/24. We again discussed in detail the aggressive nature of her tumor and the rationale for adjuvant treatment. She is considering not doing any further treatment and possibly moving back to the [facility] to live. She is currently intersted in proceeding with a baseline post surgical PET/CT and from there coming up with a final decision. Today, she is without compiaint and notes she is doing well.  Pathology: 1.  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 See comment.  2.  Uterus, cervix, bilateral fallopian tubes and ovaries; robotically assisted laparoscopic hysterectomy, bilateral salpingo-oophorectomy: -   Uterine carcinosarcoma. See comment and synoptic report. -   Left ovary with benign serous cyst measuring 1.3 cm.  3.  Left sentinel pelvic lymph node; excision: -   Three lymph nodes negative for malignancy on H T E stains, 0/3. -   Cytokeratin AE1/3 to identify isolated tumor cells will follow in an addendum.   4.  Right sentinel pelvic lymph node; excision: -   One lymph node negative for malignancy on H T E stains, 0/3. -   Cytokeratin AE1/3 to identify isolated tumor cells will follow in an addendum.  5.  Omentum; biopsy: -  Mature adipose tissue with foci involved by acute and chronic inflammation, negative for metastatic malignancy.  Immunohistochemical stain for HER2/roney shows that the tumor cells are negative, complete absence of cytoplasmic staining, Score 0.  GYN ONC Tumor Board on 1/3/24 Diagnosis: Stage IIIA endometrial carcinosarcoma Recommendation: carbo/taxol +/- tastuzumab vs dostarlimab pending HER2 testing NCCN Guidelines discussed: yes Further genetic testing: yes to send [employer] and [employer]: 1/11/2024:  = 26 ([facility])

## 2024-01-20 NOTE — ASSESSMENT
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  - Patients history and work up to date reviewed in detail. - I recently saw her on 1/3/24 for a postoperative check. We presented again today  to discuss the final path results again and the interdisciplinary TB recommendations. Final path revealed negative SLN invovlement. We discussed the TB recommendation for somatic tumor testing, obtaining HER2 status and recommendation for systemic treatment. The patient is unsure if she is willing to accept chemotherapy and/or immunotherapy. She has ongoing discussions with her family and is amenable to consultation with medical oncology with her appt on 2/9/24. We again discussed in detail the aggressive nature of her tumor and the rationale for adjuvant treatment. She is considering not doing any further treatment and possibly moving back to the [facility] to live. She is currently intersted in proceeding with a baseline post surgical PET/CT and from there coming up with a final decision.  - PET/CT ordered and my team will work on setting this up for her. -Patient declined FM (somatic) genetic testing.  - For close interval follow up to discuss her management treatment plan in person. - I spent the time noted on the day of this patient encounter preparing for, providing and documenting the above service. I have counseled and educated the patient on the differential, workup, disease course, and treatment/management plan. Education was provided to the patient during this encounter. All questions and concerns were answered and addressed in detail.

## 2024-01-20 NOTE — DISCUSSION/SUMMARY
[Clean] : was clean [Dry] : was dry [Intact] : was intact [None] : had no drainage [Doing Well] : is doing well [Excellent Pain Control] : has excellent pain control [No Sign of Infection] : is showing no signs of infection [Erythema] : was not erythematous [Ecchymosis] : was not ecchymotic [Firm] : soft [Tender] : nontender [Abnormal Bowel Sounds] : normal bowel sounds [Rebound] : no rebound tenderness [Guarding] : no guarding [Vaginal Exam Abnormal] : normal vaginal exam [de-identified] : intact, no lesions or bleeding

## 2024-01-20 NOTE — LETTER BODY
[Dear  ___] : Dear  [unfilled], [I recently saw our patient [unfilled] for a follow-up visit.] : I recently saw our patient, [unfilled] for a follow-up visit. [Attached please find my note.] : Attached please find my note. [FreeTextEntry2] :  Pt is s/p Examination under anesthesia, robotic-assisted total hysterectomy, bilateral salpingo-oophorectomy, sentinel lymph node mapping, bilateral pelvic sentinel lymph node dissection, extensive lysis of adhesions, bilateral  ureterolysis, left adnexal adhesiolysis, omental biopsy, cystoscopy, bilateral transversus abdominis plane block, suction dilation and curettage, repair of vaginal laceration on 12/14/23.     [FreeTextEntry1] : Final path

## 2024-01-31 ENCOUNTER — APPOINTMENT (OUTPATIENT)
Dept: NUCLEAR MEDICINE | Facility: CLINIC | Age: 79
End: 2024-01-31
Payer: MEDICARE

## 2024-01-31 PROCEDURE — A9552: CPT

## 2024-01-31 PROCEDURE — 78815 PET IMAGE W/CT SKULL-THIGH: CPT | Mod: PI

## 2024-02-09 ENCOUNTER — APPOINTMENT (OUTPATIENT)
Dept: GYNECOLOGIC ONCOLOGY | Facility: CLINIC | Age: 79
End: 2024-02-09
Payer: MEDICARE

## 2024-02-09 VITALS
HEART RATE: 76 BPM | HEIGHT: 61 IN | DIASTOLIC BLOOD PRESSURE: 74 MMHG | SYSTOLIC BLOOD PRESSURE: 155 MMHG | WEIGHT: 102 LBS | BODY MASS INDEX: 19.26 KG/M2

## 2024-02-09 DIAGNOSIS — Z48.89 ENCOUNTER FOR OTHER SPECIFIED SURGICAL AFTERCARE: ICD-10-CM

## 2024-02-09 PROCEDURE — 99024 POSTOP FOLLOW-UP VISIT: CPT

## 2024-02-13 NOTE — DISCUSSION/SUMMARY
[Clean] : was clean [Dry] : was dry [Intact] : was intact [None] : had no drainage [Doing Well] : is doing well [Excellent Pain Control] : has excellent pain control [No Sign of Infection] : is showing no signs of infection [Erythema] : was not erythematous [Ecchymosis] : was not ecchymotic [Firm] : soft [Tender] : nontender [Abnormal Bowel Sounds] : normal bowel sounds [Rebound] : no rebound tenderness [Guarding] : no guarding [Vaginal Exam Abnormal] : normal vaginal exam [de-identified] : intact, no lesions or bleeding

## 2024-02-13 NOTE — ASSESSMENT
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  - Patients history and work up to date reviewed in detail. - We again discussed in detail the aggressive nature of her tumor and the rationale for adjuvant treatment. She is considering not doing any further treatment and possibly moving back to the [facility] to live. She is currently intersted in proceeding with a baseline post surgical PET/CT and from there coming up with a final decision. Today, she is without complaint and notes she is doing well. She notes she has improved appetite and even gained weight. The PET/CT imaging findings concerning for metastatic disease discussed in detail with the patient. My recommendation for systemic treatment in the form of chemotherapy and immunotherapy to improve her PFS and OS from this aggressive high risk endometrial cancer dicsussed. The patient has her follow up visit with Dr. Rodriguez today but after careful reflection and discussion with her loved ones she is 95% sure she does not want to proceed with systemic treatment and verbalized understanding of worse prognosis with this. Again, she desires to move to [place] around June to be with some family and then will go to the [facility] with them thereafter.  -  PET CT of 1/31/24 reviewed in detail with patient, as well as a copy was provided to her.  Pt verbalizes understanding of findings and wishes to not pursue treatment.   -Patient declined FM (somatic) genetic testing.  - For close interval follow up prior to her trip out of [facility]. Informed her we can provide records prior to her departure.  -  She is scheduled to see Dr. Rodriguez today from Med Onc.   - I spent the time noted on the day of this patient encounter preparing for, providing and documenting the above service. I have counseled and educated the patient on the differential, workup, disease course, and treatment/management plan. Education was provided to the patient during this encounter. All questions and concerns were answered and addressed in detail.

## 2024-02-13 NOTE — REASON FOR VISIT
[Post Op] : post op visit [de-identified] : 12/14/2023 [de-identified] : Examination under anesthesia, robotic-assisted total hysterectomy, bilateral salpingo-oophorectomy, sentinel lymph node mapping, bilateral pelvic sentinel lymph node dissection, extensive lysis of adhesions, bilateral  ureterolysis, left adnexal adhesiolysis, omental biopsy, cystoscopy, bilateral transversus abdominis plane block, suction dilation and curettage, repair of vaginal laceration.   [de-identified] : Denies f/c/n/v/d/vaginal bleeding.  Overall feels well.  Meeting milestones of recovery.  Regular BM and voiding freely. I recently saw her on 1/3/24 for a postoperative check. We presented again today  to discuss the final path results again and the interdisciplinary TB recommendations. Final path revealed negative SLN invovlement. We discussed the TB recommendation for somatic tumor testing, obtaining HER2 status and recommendation for systemic treatment. The patient is unsure if she is willing to accept chemotherapy and/or immunotherapy. She has ongoing discussions with her family and is amenable to consultation with medical oncology with her appt on 24. We again discussed in detail the aggressive nature of her tumor and the rationale for adjuvant treatment. She is considering not doing any further treatment and possibly moving back to the [facility] to live. She is currently intersted in proceeding with a baseline post surgical PET/CT and from there coming up with a final decision. Today, she is without complaint and notes she is doing well. She notes she has improved appetite and even gained weight. The PET/CT imaging findings concerning for metastatic disease discussed in detail with the patient. My recommendation for systemic treatment in the form of chemotherapy and immunotherapy to improve her PFS and OS from this aggressive high risk endometrial cancer dicsussed. The patient has her follow up visit with Dr. Rodriguez today but after careful reflection and discussion with her loved ones she is 95% sure she does not want to proceed with systemic treatment and verbalized understanding of worse prognosis with this. Again, she desires to move to [place] around  to be with some family and then will go to the [facility] with them thereafter.   Pathology: 1.  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 See comment.  2.  Uterus, cervix, bilateral fallopian tubes and ovaries; robotically assisted laparoscopic hysterectomy, bilateral salpingo-oophorectomy: -   Uterine carcinosarcoma. See comment and synoptic report. -   Left ovary with benign serous cyst measuring 1.3 cm.  3.  Left sentinel pelvic lymph node; excision: -   Three lymph nodes negative for malignancy on H T E stains, 0/3. -   Cytokeratin AE1/3 to identify isolated tumor cells will follow in an addendum.   4.  Right sentinel pelvic lymph node; excision: -   One lymph node negative for malignancy on H T E stains, 0/3. -   Cytokeratin AE1/3 to identify isolated tumor cells will follow in an addendum.  5.  Omentum; biopsy: -  Mature adipose tissue with foci involved by acute and chronic inflammation, negative for metastatic malignancy.  Immunohistochemical stain for HER2/roney shows that the tumor cells are negative, complete absence of cytoplasmic staining, Score 0.  GYN ONC Tumor Board on 1/3/24 Diagnosis: Stage IIIA endometrial carcinosarcoma Recommendation: carbo/taxol +/- tastuzumab vs dostarlimab pending HER2 testing NCCN Guidelines discussed: yes Further genetic testing: yes to send  and [employer]  Labs: 2024:  = 26 ([facility])  Imagin24 PET CT ([facility]) IMPRESSION: Abnormal PET/CT scan with findings suspicious for multiple sites of metastatic disease. 1.  No abnormal uptake at the hysterectomy site. 2.  Multiple BILATERAL hypermetabolic pulmonary nodules, highly suspicious for metastatic malignancy. 3.  Hypermetabolic focus within the liver, suspicious for metastatic deposit. 4.  Isolated osseous lesion in the RIGHT pubic ramus, suspicious for metastatic focus. 5.  Foci in the pelvis are not well delineated on low-dose CT, but metastatic lymphadenopathy is suspected and MRI may be helpful if this will alter patient management. 6.  Ill-defined uptake at RIGHT thyroid nodule; suggest sonography and possible biopsy to exclude a separate malignancy versus possible hypofunctioning nodule. 25-50% of FDG-avid thyroid nodules show malignant histology.

## 2024-03-18 RX ORDER — OXYCODONE 5 MG/1
5 TABLET ORAL
Qty: 10 | Refills: 0 | Status: ACTIVE | COMMUNITY
Start: 2024-03-18 | End: 1900-01-01

## 2024-03-19 ENCOUNTER — APPOINTMENT (OUTPATIENT)
Dept: GYNECOLOGIC ONCOLOGY | Facility: CLINIC | Age: 79
End: 2024-03-19
Payer: MEDICARE

## 2024-03-19 PROCEDURE — 99212 OFFICE O/P EST SF 10 MIN: CPT

## 2024-03-22 ENCOUNTER — APPOINTMENT (OUTPATIENT)
Dept: GYNECOLOGIC ONCOLOGY | Facility: CLINIC | Age: 79
End: 2024-03-22

## 2024-03-23 NOTE — DISCUSSION/SUMMARY
[Reviewed Radiology Report(s)] : Radiology reports were reviewed. [Discuss Alternatives/Risks/Benefits w/Patient] : All alternatives, risks, and benefits were discussed with the patient/family and all questions were answered.  Patient expressed good understanding and appreciates the importance of follow up as recommended. [Visit Time ___ Minutes] : [unfilled] minutes [FreeTextEntry1] : - Patients history and work up to date reviewed in detail. -Patient refused adjuvant treatment despite extensive counseling.  She presents to this visit due to increasing pain in her hips, abdomen and back.The patient requested that we speak via telemedicine because she would like to proceed with taking narcotic medication for help with her pain control was prescribed by my team.  I encouraged her to use multimodality treatment including Tylenol and the prescribed oxycodone as indicated.  She still desires to proceed with palliative treatment for which her wishes were supported.  Additionally the patient has follow-up with palliative care tomorrow; however, warning signs of when to seek evaluation sooner including precautions and when to seek evaluation in the emergency room were discussed.  She currently declines any further imaging workup or laboratory workup at this time.  I encouraged to see me her for close interval follow-up this week and appointment was made but she expressed verbal understanding on when to seek evaluation sooner. - I spent the time noted on the day of this patient encounter preparing for, providing and documenting the above service. I have counseled and educated the patient on the differential, workup, disease course, and treatment/management plan. Education was provided to the patient during this encounter. All questions and concerns were answered and addressed in detail.

## 2024-03-23 NOTE — ASSESSMENT
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

## 2024-03-23 NOTE — HISTORY OF PRESENT ILLNESS
[FreeTextEntry1] : **Please note that this visit was converted to telemed due to technical difficulties** Permission was granted for this visit.  Ms. Auguste, 78 years old, is status post EUA, robotic assisted TLH-BSO, sentinel lymph node mapping, B/L pelvic sentinel lymph node dissection, extensive HUSSAIN, B/L ureterolysis, left adnexal adhesiolysis, omental biopsy, cystoscopy, B/L tap block for a stage IIIa endometrial carcinosarcoma on 12/14/2023.  GYN oncology tumor board recommendation recommended carbo/Taxol +/- trastuzumab versus Dostarlimab pending HER 2 testing.  Patient refused adjuvant treatment despite extensive counseling.  She presents to this visit due to increasing pain in her hips, abdomen and back.The patient requested that we speak via telemedicine because she would like to proceed with taking narcotic medication for help with her pain control was prescribed by my team.  I encouraged her to use multimodality treatment including Tylenol and the prescribed oxycodone as indicated.  She still desires to proceed with palliative treatment for which her wishes were supported.  Additionally the patient has follow-up with palliative care tomorrow; however, warning signs of when to seek evaluation sooner including precautions and when to seek evaluation in the emergency room were discussed.  She still notes that she is tolerating food without issue and having normal bowel and bladder movements.  She denies any vaginal bleeding. She currently declines any further imaging workup or laboratory workup at this time.  I encouraged to see me her for close interval follow-up this week and appointment was made but she expressed verbal understanding on when to seek evaluation sooner.  Pathology: 1. 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 See comment.  2. Uterus, cervix, bilateral fallopian tubes and ovaries; robotically assisted laparoscopic hysterectomy, bilateral salpingo-oophorectomy: - Uterine carcinosarcoma. See comment and synoptic report. - Left ovary with benign serous cyst measuring 1.3 cm.  3. Left sentinel pelvic lymph node; excision: - Three lymph nodes negative for malignancy on H T E stains, 0/3. - Cytokeratin AE1/3 to identify isolated tumor cells will follow in an addendum.  4. Right sentinel pelvic lymph node; excision: - One lymph node negative for malignancy on H T E stains, 0/3. - Cytokeratin AE1/3 to identify isolated tumor cells will follow in an addendum.  5. Omentum; biopsy: - Mature adipose tissue with foci involved by acute and chronic inflammation, negative for metastatic malignancy.  Immunohistochemical stain for HER2/roney shows that the tumor cells are negative, complete absence of cytoplasmic staining, Score 0. 
[Follow-Up: _____] : a [unfilled] follow-up visit

## 2024-04-03 NOTE — DISCUSSION/SUMMARY
Prior authorization placed today for [employer] and sent to the [facility].   [Reviewed Clinical Lab Test(s)] : Results of clinical tests were reviewed. [Reviewed Radiology Report(s)] : Radiology reports were reviewed. [Discuss Tests w/Referring Providers] : Results of labs/radiology studies and the treatment recommendations were discussed with performing/referring physician. [Discuss Alternatives/Risks/Benefits w/Patient] : All alternatives, risks, and benefits were discussed with the patient/family and all questions were answered.  Patient expressed good understanding and appreciates the importance of follow up as recommended. [Visit Time ___ Minutes] : [unfilled] minutes [FreeTextEntry1] : - Patients history and work up to date reviewed in detail.  -  I recently saw her last week for a postoperative check with ultrastaining from her SLNM's still pending. We presented today via vteb to discuss the final path results again and the interdisciplinary TB recommendations. Final path revealed negative SLN invovlement. We discussed the TB recommendation for somatic tumor testing, obtaining HER2 status and recommendation for systemic treatment. The patient is unsure if she is willing to accept chemotherapy and/or immunotherapy. She is planning to talk to her family further and is amenable to consultation with medical oncology.  - Will have my navigation team reach out to her to set up her medical oncology consultation. -For close interval follow up to discuss her adjvuant treatment plan in person.  - I spent the time noted on the day of this patient encounter preparing for, providing and documenting the above service. I have counseled and educated the patient on the differential, workup, disease course, and treatment/management plan. Education was provided to the patient during this encounter. All questions and concerns were answered and addressed in detail.

## 2024-05-16 PROBLEM — C54.1 ENDOMETRIAL CARCINOMA: Status: ACTIVE | Noted: 2023-11-09

## 2024-05-16 NOTE — HISTORY OF PRESENT ILLNESS
[FreeTextEntry1] :   Ms. Auguste, 78 years old, is status post EUA, robotic assisted TLH-BSO, sentinel lymph node mapping, B/L pelvic sentinel lymph node dissection, extensive HUSSAIN, B/L ureterolysis, left adnexal adhesiolysis, omental biopsy, cystoscopy, B/L tap block for a stage IIIa endometrial carcinosarcoma on 12/14/2023.  GYN oncology tumor board recommendation recommended carbo/Taxol +/- trastuzumab versus Dostarlimab pending HER 2 testing.  Patient refused adjuvant treatment despite extensive counseling.  She presents to this visit due to increasing pain in her hips, abdomen and back.The patient requested that we speak via telemedicine because she would like to proceed with taking narcotic medication for help with her pain control was prescribed by my team.  I encouraged her to use multimodality treatment including Tylenol and the prescribed oxycodone as indicated.  She still desires to proceed with palliative treatment for which her wishes were supported.  Additionally the patient has follow-up with palliative care tomorrow; however, warning signs of when to seek evaluation sooner including precautions and when to seek evaluation in the emergency room were discussed.  She still notes that she is tolerating food without issue and having normal bowel and bladder movements.  She denies any vaginal bleeding. She currently declines any further imaging workup or laboratory workup at this time.  I encouraged to see me her for close interval follow-up this week and appointment was made but she expressed verbal understanding on when to seek evaluation sooner.  Pathology: 1. 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 See comment.  2. Uterus, cervix, bilateral fallopian tubes and ovaries; robotically assisted laparoscopic hysterectomy, bilateral salpingo-oophorectomy: - Uterine carcinosarcoma. See comment and synoptic report. - Left ovary with benign serous cyst measuring 1.3 cm.  3. Left sentinel pelvic lymph node; excision: - Three lymph nodes negative for malignancy on H T E stains, 0/3. - Cytokeratin AE1/3 to identify isolated tumor cells will follow in an addendum.  4. Right sentinel pelvic lymph node; excision: - One lymph node negative for malignancy on H T E stains, 0/3. - Cytokeratin AE1/3 to identify isolated tumor cells will follow in an addendum.  5. Omentum; biopsy: - Mature adipose tissue with foci involved by acute and chronic inflammation, negative for metastatic malignancy.  Immunohistochemical stain for HER2/roney shows that the tumor cells are negative, complete absence of cytoplasmic staining, Score 0.

## 2024-05-17 ENCOUNTER — APPOINTMENT (OUTPATIENT)
Dept: GYNECOLOGIC ONCOLOGY | Facility: CLINIC | Age: 79
End: 2024-05-17

## 2024-05-17 ENCOUNTER — NON-APPOINTMENT (OUTPATIENT)
Age: 79
End: 2024-05-17

## 2024-05-17 DIAGNOSIS — C54.1 MALIGNANT NEOPLASM OF ENDOMETRIUM: ICD-10-CM

## 2024-06-10 ENCOUNTER — NON-APPOINTMENT (OUTPATIENT)
Age: 79
End: 2024-06-10
